# Patient Record
Sex: FEMALE | Race: BLACK OR AFRICAN AMERICAN | NOT HISPANIC OR LATINO
[De-identification: names, ages, dates, MRNs, and addresses within clinical notes are randomized per-mention and may not be internally consistent; named-entity substitution may affect disease eponyms.]

---

## 2018-11-05 PROBLEM — Z00.00 ENCOUNTER FOR PREVENTIVE HEALTH EXAMINATION: Status: ACTIVE | Noted: 2018-11-05

## 2018-11-09 ENCOUNTER — APPOINTMENT (OUTPATIENT)
Dept: BREAST CENTER | Facility: CLINIC | Age: 48
End: 2018-11-09

## 2018-12-06 ENCOUNTER — APPOINTMENT (OUTPATIENT)
Dept: BREAST CENTER | Facility: CLINIC | Age: 48
End: 2018-12-06
Payer: COMMERCIAL

## 2018-12-06 ENCOUNTER — CHART COPY (OUTPATIENT)
Age: 48
End: 2018-12-06

## 2018-12-06 VITALS
BODY MASS INDEX: 33.29 KG/M2 | DIASTOLIC BLOOD PRESSURE: 94 MMHG | SYSTOLIC BLOOD PRESSURE: 146 MMHG | WEIGHT: 195 LBS | HEIGHT: 64 IN | HEART RATE: 116 BPM | OXYGEN SATURATION: 97 % | TEMPERATURE: 98.6 F

## 2018-12-06 DIAGNOSIS — Z78.9 OTHER SPECIFIED HEALTH STATUS: ICD-10-CM

## 2018-12-06 DIAGNOSIS — Z87.09 PERSONAL HISTORY OF OTHER DISEASES OF THE RESPIRATORY SYSTEM: ICD-10-CM

## 2018-12-06 DIAGNOSIS — F17.200 NICOTINE DEPENDENCE, UNSPECIFIED, UNCOMPLICATED: ICD-10-CM

## 2018-12-06 DIAGNOSIS — Z83.3 FAMILY HISTORY OF DIABETES MELLITUS: ICD-10-CM

## 2018-12-06 PROCEDURE — 99203 OFFICE O/P NEW LOW 30 MIN: CPT

## 2020-02-18 ENCOUNTER — APPOINTMENT (OUTPATIENT)
Dept: CARDIOLOGY | Facility: CLINIC | Age: 50
End: 2020-02-18
Payer: COMMERCIAL

## 2020-02-18 ENCOUNTER — NON-APPOINTMENT (OUTPATIENT)
Age: 50
End: 2020-02-18

## 2020-02-18 VITALS
WEIGHT: 238 LBS | HEART RATE: 97 BPM | HEIGHT: 64 IN | OXYGEN SATURATION: 97 % | DIASTOLIC BLOOD PRESSURE: 86 MMHG | BODY MASS INDEX: 40.63 KG/M2 | SYSTOLIC BLOOD PRESSURE: 128 MMHG

## 2020-02-18 DIAGNOSIS — I51.7 CARDIOMEGALY: ICD-10-CM

## 2020-02-18 DIAGNOSIS — R73.03 PREDIABETES.: ICD-10-CM

## 2020-02-18 DIAGNOSIS — Z82.49 FAMILY HISTORY OF ISCHEMIC HEART DISEASE AND OTHER DISEASES OF THE CIRCULATORY SYSTEM: ICD-10-CM

## 2020-02-18 DIAGNOSIS — Z86.39 PERSONAL HISTORY OF OTHER ENDOCRINE, NUTRITIONAL AND METABOLIC DISEASE: ICD-10-CM

## 2020-02-18 DIAGNOSIS — Z87.898 PERSONAL HISTORY OF OTHER SPECIFIED CONDITIONS: ICD-10-CM

## 2020-02-18 PROCEDURE — 99203 OFFICE O/P NEW LOW 30 MIN: CPT

## 2020-02-18 PROCEDURE — 93306 TTE W/DOPPLER COMPLETE: CPT

## 2020-02-18 PROCEDURE — 93000 ELECTROCARDIOGRAM COMPLETE: CPT

## 2020-02-18 NOTE — PHYSICAL EXAM
[General Appearance - Well Developed] : well developed [Normal Appearance] : normal appearance [Well Groomed] : well groomed [General Appearance - Well Nourished] : well nourished [General Appearance - In No Acute Distress] : no acute distress [No Deformities] : no deformities [Normal Conjunctiva] : the conjunctiva exhibited no abnormalities [Eyelids - No Xanthelasma] : the eyelids demonstrated no xanthelasmas [Normal Oral Mucosa] : normal oral mucosa [No Oral Pallor] : no oral pallor [No Oral Cyanosis] : no oral cyanosis [Heart Rate And Rhythm] : heart rate and rhythm were normal [Heart Sounds] : normal S1 and S2 [Murmurs] : no murmurs present [Edema] : no peripheral edema present [Arterial Pulses Normal] : the arterial pulses were normal [Respiration, Rhythm And Depth] : normal respiratory rhythm and effort [Auscultation Breath Sounds / Voice Sounds] : lungs were clear to auscultation bilaterally [Bowel Sounds] : normal bowel sounds [Abdomen Soft] : soft [Abdomen Tenderness] : non-tender [Abnormal Walk] : normal gait [Nail Clubbing] : no clubbing of the fingernails [Cyanosis, Localized] : no localized cyanosis [Skin Color & Pigmentation] : normal skin color and pigmentation [Skin Turgor] : normal skin turgor [] : no rash [Oriented To Time, Place, And Person] : oriented to person, place, and time [Impaired Insight] : insight and judgment were intact [No Anxiety] : not feeling anxious [FreeTextEntry1] : No JVD

## 2020-02-18 NOTE — HISTORY OF PRESENT ILLNESS
[FreeTextEntry1] : Margaret Hess is a 49 year old female with history of Prediabetes is referred for cardiac evaluation for cardiomegaly. Denies any chest pain or shortness of breath on exertion. No palpitations. Physically active. Active cigarette smoker.

## 2020-02-18 NOTE — DISCUSSION/SUMMARY
[FreeTextEntry1] : In a summary Margaret Hess is a middle aged female with Cardiomegaly, echo done showed normal LV systolic function. Asymptomatic cardiac wise. Counseled to quit smoking. Lose weight. Healthy lifestyle. Follow up in 6 months.

## 2020-02-18 NOTE — REVIEW OF SYSTEMS
[Joint Pain] : joint pain [Negative] : Psychiatric [Easy Bleeding] : no tendency for easy bleeding [Easy Bruising] : no tendency for easy bruising

## 2020-08-18 ENCOUNTER — APPOINTMENT (OUTPATIENT)
Dept: CARDIOLOGY | Facility: CLINIC | Age: 50
End: 2020-08-18

## 2020-10-29 ENCOUNTER — APPOINTMENT (OUTPATIENT)
Dept: CARDIOLOGY | Facility: CLINIC | Age: 50
End: 2020-10-29

## 2020-12-09 ENCOUNTER — APPOINTMENT (OUTPATIENT)
Dept: CARDIOLOGY | Facility: CLINIC | Age: 50
End: 2020-12-09
Payer: COMMERCIAL

## 2020-12-09 ENCOUNTER — NON-APPOINTMENT (OUTPATIENT)
Age: 50
End: 2020-12-09

## 2020-12-09 VITALS
HEART RATE: 110 BPM | HEIGHT: 64 IN | TEMPERATURE: 98.1 F | OXYGEN SATURATION: 97 % | DIASTOLIC BLOOD PRESSURE: 84 MMHG | SYSTOLIC BLOOD PRESSURE: 122 MMHG

## 2020-12-09 PROCEDURE — 99214 OFFICE O/P EST MOD 30 MIN: CPT

## 2020-12-09 PROCEDURE — 93000 ELECTROCARDIOGRAM COMPLETE: CPT

## 2020-12-09 PROCEDURE — 99072 ADDL SUPL MATRL&STAF TM PHE: CPT

## 2020-12-09 NOTE — REVIEW OF SYSTEMS
[Joint Pain] : joint pain [Negative] : Endocrine [Easy Bleeding] : no tendency for easy bleeding [Easy Bruising] : no tendency for easy bruising

## 2020-12-09 NOTE — DISCUSSION/SUMMARY
[FreeTextEntry1] : In a summary Margaret Hess is a middle aged female with prediabetes, low Crab diet. Lose weight. Cardiomegaly by chest x ray but Echo is normal. Sinus tachycardia, denies any thyroid problems. Stop coffee and Sodas. Drinks plenty of water. Stop smoking cigarettes. Lose weight. Follow up in 3 months.

## 2020-12-09 NOTE — PHYSICAL EXAM
[General Appearance - Well Developed] : well developed [Normal Appearance] : normal appearance [Well Groomed] : well groomed [General Appearance - Well Nourished] : well nourished [No Deformities] : no deformities [General Appearance - In No Acute Distress] : no acute distress [Normal Conjunctiva] : the conjunctiva exhibited no abnormalities [Eyelids - No Xanthelasma] : the eyelids demonstrated no xanthelasmas [Normal Oral Mucosa] : normal oral mucosa [No Oral Pallor] : no oral pallor [No Oral Cyanosis] : no oral cyanosis [Respiration, Rhythm And Depth] : normal respiratory rhythm and effort [Auscultation Breath Sounds / Voice Sounds] : lungs were clear to auscultation bilaterally [Heart Sounds] : normal S1 and S2 [Murmurs] : no murmurs present [Arterial Pulses Normal] : the arterial pulses were normal [Edema] : no peripheral edema present [Bowel Sounds] : normal bowel sounds [Abdomen Soft] : soft [Abdomen Tenderness] : non-tender [Abnormal Walk] : normal gait [Nail Clubbing] : no clubbing of the fingernails [Cyanosis, Localized] : no localized cyanosis [Skin Color & Pigmentation] : normal skin color and pigmentation [Skin Turgor] : normal skin turgor [] : no rash [Oriented To Time, Place, And Person] : oriented to person, place, and time [Impaired Insight] : insight and judgment were intact [No Anxiety] : not feeling anxious [FreeTextEntry1] : tachycardic.

## 2020-12-09 NOTE — HISTORY OF PRESENT ILLNESS
[FreeTextEntry1] : Margaret Hess is a 50 year old female with prediabetes and Cardiomegaly comes for follow up visit. Denies any chest pain or shortness of breath. No palpitations. Still smoking cigarettes. Physically active. EKG showing sinus tachycardia at 109 bpm. Drinks coffee and small cup of soda.

## 2021-03-24 ENCOUNTER — APPOINTMENT (OUTPATIENT)
Dept: CARDIOLOGY | Facility: CLINIC | Age: 51
End: 2021-03-24

## 2021-04-21 ENCOUNTER — APPOINTMENT (OUTPATIENT)
Dept: CARDIOLOGY | Facility: CLINIC | Age: 51
End: 2021-04-21

## 2021-05-03 ENCOUNTER — APPOINTMENT (OUTPATIENT)
Dept: CARDIOLOGY | Facility: CLINIC | Age: 51
End: 2021-05-03
Payer: COMMERCIAL

## 2021-05-05 ENCOUNTER — NON-APPOINTMENT (OUTPATIENT)
Age: 51
End: 2021-05-05

## 2021-05-05 ENCOUNTER — APPOINTMENT (OUTPATIENT)
Dept: CARDIOLOGY | Facility: CLINIC | Age: 51
End: 2021-05-05
Payer: COMMERCIAL

## 2021-05-05 VITALS
DIASTOLIC BLOOD PRESSURE: 74 MMHG | HEIGHT: 64 IN | SYSTOLIC BLOOD PRESSURE: 110 MMHG | TEMPERATURE: 98.9 F | OXYGEN SATURATION: 96 % | HEART RATE: 101 BPM

## 2021-05-05 DIAGNOSIS — R00.0 TACHYCARDIA, UNSPECIFIED: ICD-10-CM

## 2021-05-05 PROCEDURE — 93306 TTE W/DOPPLER COMPLETE: CPT

## 2021-05-05 PROCEDURE — 93000 ELECTROCARDIOGRAM COMPLETE: CPT

## 2021-05-05 PROCEDURE — 99072 ADDL SUPL MATRL&STAF TM PHE: CPT

## 2021-05-05 PROCEDURE — 99213 OFFICE O/P EST LOW 20 MIN: CPT

## 2021-05-05 NOTE — DISCUSSION/SUMMARY
[FreeTextEntry1] : In a summary Margaret Hess is a middle aged female with on and off sinus tachycardia, EKG showed sinus rhythm at 99 bpm. Echo done showed normal LV systolic function. Lose weight. Quit smoking cigarettes. Drink plenty of water. denies any thyroid disorders or anemia. Follow up in 6 months.

## 2021-05-05 NOTE — HISTORY OF PRESENT ILLNESS
[FreeTextEntry1] : Margaret Hess is a 50 year old female with prediabetes and on and off sinus tachycardia comes for follow up visit. Denies any chest pain or palpitations. No shortness of breath on exertion. Smoking cigarettes. Says physically active. Denies drinking  any coffee, Tea etc. Follows up with PCP.

## 2021-05-05 NOTE — PHYSICAL EXAM
[Normal S1, S2] : normal S1, S2 [No Carotid Bruit] : no carotid bruit [Soft] : abdomen soft [Non Tender] : non-tender [Normal Bowel Sounds] : normal bowel sounds [No Edema] : no edema [No Cyanosis] : no cyanosis [Normal] : alert and oriented, normal memory

## 2021-11-03 ENCOUNTER — APPOINTMENT (OUTPATIENT)
Dept: CARDIOLOGY | Facility: CLINIC | Age: 51
End: 2021-11-03

## 2024-12-27 ENCOUNTER — INPATIENT (INPATIENT)
Facility: HOSPITAL | Age: 54
LOS: 1 days | Discharge: HOME | End: 2024-12-29
Attending: INTERNAL MEDICINE | Admitting: INTERNAL MEDICINE
Payer: COMMERCIAL

## 2024-12-27 VITALS
RESPIRATION RATE: 18 BRPM | HEART RATE: 110 BPM | DIASTOLIC BLOOD PRESSURE: 82 MMHG | OXYGEN SATURATION: 99 % | TEMPERATURE: 101 F | HEIGHT: 65 IN | WEIGHT: 169.98 LBS | SYSTOLIC BLOOD PRESSURE: 118 MMHG

## 2024-12-27 DIAGNOSIS — N61.1 ABSCESS OF THE BREAST AND NIPPLE: ICD-10-CM

## 2024-12-27 DIAGNOSIS — A41.9 SEPSIS, UNSPECIFIED ORGANISM: ICD-10-CM

## 2024-12-27 LAB
ALBUMIN SERPL ELPH-MCNC: 3.3 G/DL — SIGNIFICANT CHANGE UP (ref 3.3–5)
ALP SERPL-CCNC: 87 U/L — SIGNIFICANT CHANGE UP (ref 40–120)
ALT FLD-CCNC: 21 U/L — SIGNIFICANT CHANGE UP (ref 12–78)
ANION GAP SERPL CALC-SCNC: 5 MMOL/L — SIGNIFICANT CHANGE UP (ref 5–17)
APTT BLD: 32.9 SEC — SIGNIFICANT CHANGE UP (ref 24.5–35.6)
AST SERPL-CCNC: 12 U/L — LOW (ref 15–37)
B-OH-BUTYR SERPL-SCNC: 0.2 MMOL/L — SIGNIFICANT CHANGE UP
BASOPHILS # BLD AUTO: 0.03 K/UL — SIGNIFICANT CHANGE UP (ref 0–0.2)
BASOPHILS NFR BLD AUTO: 0.2 % — SIGNIFICANT CHANGE UP (ref 0–2)
BILIRUB SERPL-MCNC: 0.3 MG/DL — SIGNIFICANT CHANGE UP (ref 0.2–1.2)
BUN SERPL-MCNC: 8 MG/DL — SIGNIFICANT CHANGE UP (ref 7–23)
CALCIUM SERPL-MCNC: 9.4 MG/DL — SIGNIFICANT CHANGE UP (ref 8.5–10.1)
CHLORIDE SERPL-SCNC: 98 MMOL/L — SIGNIFICANT CHANGE UP (ref 96–108)
CO2 SERPL-SCNC: 29 MMOL/L — SIGNIFICANT CHANGE UP (ref 22–31)
CREAT SERPL-MCNC: 0.81 MG/DL — SIGNIFICANT CHANGE UP (ref 0.5–1.3)
EGFR: 85 ML/MIN/1.73M2 — SIGNIFICANT CHANGE UP
EOSINOPHIL # BLD AUTO: 0.13 K/UL — SIGNIFICANT CHANGE UP (ref 0–0.5)
EOSINOPHIL NFR BLD AUTO: 1 % — SIGNIFICANT CHANGE UP (ref 0–6)
FLUAV AG NPH QL: SIGNIFICANT CHANGE UP
FLUBV AG NPH QL: SIGNIFICANT CHANGE UP
GLUCOSE BLDC GLUCOMTR-MCNC: 258 MG/DL — HIGH (ref 70–99)
GLUCOSE BLDC GLUCOMTR-MCNC: 306 MG/DL — HIGH (ref 70–99)
GLUCOSE SERPL-MCNC: 273 MG/DL — HIGH (ref 70–99)
GRAM STN FLD: ABNORMAL
HCT VFR BLD CALC: 39.4 % — SIGNIFICANT CHANGE UP (ref 34.5–45)
HGB BLD-MCNC: 13.5 G/DL — SIGNIFICANT CHANGE UP (ref 11.5–15.5)
IMM GRANULOCYTES NFR BLD AUTO: 0.5 % — SIGNIFICANT CHANGE UP (ref 0–0.9)
INR BLD: 1.04 RATIO — SIGNIFICANT CHANGE UP (ref 0.85–1.16)
LACTATE SERPL-SCNC: 1.3 MMOL/L — SIGNIFICANT CHANGE UP (ref 0.7–2)
LYMPHOCYTES # BLD AUTO: 15.6 % — SIGNIFICANT CHANGE UP (ref 13–44)
LYMPHOCYTES # BLD AUTO: 2.1 K/UL — SIGNIFICANT CHANGE UP (ref 1–3.3)
MCHC RBC-ENTMCNC: 30.7 PG — SIGNIFICANT CHANGE UP (ref 27–34)
MCHC RBC-ENTMCNC: 34.3 G/DL — SIGNIFICANT CHANGE UP (ref 32–36)
MCV RBC AUTO: 89.5 FL — SIGNIFICANT CHANGE UP (ref 80–100)
MONOCYTES # BLD AUTO: 0.88 K/UL — SIGNIFICANT CHANGE UP (ref 0–0.9)
MONOCYTES NFR BLD AUTO: 6.5 % — SIGNIFICANT CHANGE UP (ref 2–14)
NEUTROPHILS # BLD AUTO: 10.24 K/UL — HIGH (ref 1.8–7.4)
NEUTROPHILS NFR BLD AUTO: 76.2 % — SIGNIFICANT CHANGE UP (ref 43–77)
NRBC # BLD: 0 /100 WBCS — SIGNIFICANT CHANGE UP (ref 0–0)
PLATELET # BLD AUTO: 283 K/UL — SIGNIFICANT CHANGE UP (ref 150–400)
POTASSIUM SERPL-MCNC: 3.7 MMOL/L — SIGNIFICANT CHANGE UP (ref 3.5–5.3)
POTASSIUM SERPL-SCNC: 3.7 MMOL/L — SIGNIFICANT CHANGE UP (ref 3.5–5.3)
PROT SERPL-MCNC: 8.1 GM/DL — SIGNIFICANT CHANGE UP (ref 6–8.3)
PROTHROM AB SERPL-ACNC: 12.1 SEC — SIGNIFICANT CHANGE UP (ref 9.9–13.4)
RBC # BLD: 4.4 M/UL — SIGNIFICANT CHANGE UP (ref 3.8–5.2)
RBC # FLD: 12.5 % — SIGNIFICANT CHANGE UP (ref 10.3–14.5)
RSV RNA NPH QL NAA+NON-PROBE: SIGNIFICANT CHANGE UP
SARS-COV-2 RNA SPEC QL NAA+PROBE: SIGNIFICANT CHANGE UP
SODIUM SERPL-SCNC: 132 MMOL/L — LOW (ref 135–145)
SPECIMEN SOURCE: SIGNIFICANT CHANGE UP
WBC # BLD: 13.45 K/UL — HIGH (ref 3.8–10.5)
WBC # FLD AUTO: 13.45 K/UL — HIGH (ref 3.8–10.5)

## 2024-12-27 PROCEDURE — 10061 I&D ABSCESS COMP/MULTIPLE: CPT

## 2024-12-27 PROCEDURE — 99223 1ST HOSP IP/OBS HIGH 75: CPT

## 2024-12-27 PROCEDURE — 99285 EMERGENCY DEPT VISIT HI MDM: CPT | Mod: 25

## 2024-12-27 PROCEDURE — 71046 X-RAY EXAM CHEST 2 VIEWS: CPT | Mod: 26

## 2024-12-27 PROCEDURE — 99222 1ST HOSP IP/OBS MODERATE 55: CPT

## 2024-12-27 RX ORDER — SODIUM CHLORIDE 9 MG/ML
1000 INJECTION, SOLUTION INTRAVENOUS
Refills: 0 | Status: DISCONTINUED | OUTPATIENT
Start: 2024-12-27 | End: 2024-12-29

## 2024-12-27 RX ORDER — VANCOMYCIN HYDROCHLORIDE 5 G/100ML
750 INJECTION, POWDER, LYOPHILIZED, FOR SOLUTION INTRAVENOUS EVERY 12 HOURS
Refills: 0 | Status: DISCONTINUED | OUTPATIENT
Start: 2024-12-28 | End: 2024-12-29

## 2024-12-27 RX ORDER — METFORMIN 850 MG/1
1 TABLET ORAL
Refills: 0 | DISCHARGE

## 2024-12-27 RX ORDER — DEXTROSE MONOHYDRATE 25 G/50ML
25 INJECTION, SOLUTION INTRAVENOUS ONCE
Refills: 0 | Status: DISCONTINUED | OUTPATIENT
Start: 2024-12-27 | End: 2024-12-29

## 2024-12-27 RX ORDER — GLUCAGON INJECTION, SOLUTION 0.5 MG/.1ML
1 INJECTION, SOLUTION SUBCUTANEOUS ONCE
Refills: 0 | Status: DISCONTINUED | OUTPATIENT
Start: 2024-12-27 | End: 2024-12-29

## 2024-12-27 RX ORDER — PIPERACILLIN AND TAZOBACTAM 3; .375 G/15ML; G/15ML
3.38 INJECTION, POWDER, LYOPHILIZED, FOR SOLUTION INTRAVENOUS ONCE
Refills: 0 | Status: COMPLETED | OUTPATIENT
Start: 2024-12-27 | End: 2024-12-27

## 2024-12-27 RX ORDER — PIPERACILLIN AND TAZOBACTAM 3; .375 G/15ML; G/15ML
3.38 INJECTION, POWDER, LYOPHILIZED, FOR SOLUTION INTRAVENOUS ONCE
Refills: 0 | Status: COMPLETED | OUTPATIENT
Start: 2024-12-28 | End: 2024-12-28

## 2024-12-27 RX ORDER — PIPERACILLIN AND TAZOBACTAM 3; .375 G/15ML; G/15ML
3.38 INJECTION, POWDER, LYOPHILIZED, FOR SOLUTION INTRAVENOUS EVERY 8 HOURS
Refills: 0 | Status: DISCONTINUED | OUTPATIENT
Start: 2024-12-28 | End: 2024-12-29

## 2024-12-27 RX ORDER — DEXTROSE MONOHYDRATE 25 G/50ML
12.5 INJECTION, SOLUTION INTRAVENOUS ONCE
Refills: 0 | Status: DISCONTINUED | OUTPATIENT
Start: 2024-12-27 | End: 2024-12-29

## 2024-12-27 RX ORDER — INSULIN LISPRO 100/ML
VIAL (ML) SUBCUTANEOUS
Refills: 0 | Status: DISCONTINUED | OUTPATIENT
Start: 2024-12-27 | End: 2024-12-29

## 2024-12-27 RX ORDER — OXYCODONE HCL 15 MG
5 TABLET ORAL EVERY 6 HOURS
Refills: 0 | Status: DISCONTINUED | OUTPATIENT
Start: 2024-12-27 | End: 2024-12-29

## 2024-12-27 RX ORDER — CEFAZOLIN SODIUM 1 G
2000 VIAL (EA) INJECTION ONCE
Refills: 0 | Status: COMPLETED | OUTPATIENT
Start: 2024-12-27 | End: 2024-12-27

## 2024-12-27 RX ORDER — VANCOMYCIN HYDROCHLORIDE 5 G/100ML
1250 INJECTION, POWDER, LYOPHILIZED, FOR SOLUTION INTRAVENOUS EVERY 12 HOURS
Refills: 0 | Status: DISCONTINUED | OUTPATIENT
Start: 2024-12-27 | End: 2024-12-27

## 2024-12-27 RX ORDER — INSULIN LISPRO 100/ML
VIAL (ML) SUBCUTANEOUS AT BEDTIME
Refills: 0 | Status: DISCONTINUED | OUTPATIENT
Start: 2024-12-27 | End: 2024-12-29

## 2024-12-27 RX ORDER — ACETAMINOPHEN 80 MG/.8ML
1000 SOLUTION/ DROPS ORAL ONCE
Refills: 0 | Status: COMPLETED | OUTPATIENT
Start: 2024-12-27 | End: 2024-12-27

## 2024-12-27 RX ORDER — KETOROLAC TROMETHAMINE 30 MG/ML
15 INJECTION INTRAMUSCULAR; INTRAVENOUS ONCE
Refills: 0 | Status: DISCONTINUED | OUTPATIENT
Start: 2024-12-27 | End: 2024-12-27

## 2024-12-27 RX ORDER — GINKGO BILOBA 40 MG
3 CAPSULE ORAL AT BEDTIME
Refills: 0 | Status: DISCONTINUED | OUTPATIENT
Start: 2024-12-27 | End: 2024-12-29

## 2024-12-27 RX ORDER — DEXTROSE MONOHYDRATE 25 G/50ML
15 INJECTION, SOLUTION INTRAVENOUS ONCE
Refills: 0 | Status: DISCONTINUED | OUTPATIENT
Start: 2024-12-27 | End: 2024-12-29

## 2024-12-27 RX ORDER — ONDANSETRON 4 MG/1
4 TABLET ORAL EVERY 8 HOURS
Refills: 0 | Status: DISCONTINUED | OUTPATIENT
Start: 2024-12-27 | End: 2024-12-29

## 2024-12-27 RX ORDER — ACETAMINOPHEN 80 MG/.8ML
650 SOLUTION/ DROPS ORAL EVERY 6 HOURS
Refills: 0 | Status: DISCONTINUED | OUTPATIENT
Start: 2024-12-27 | End: 2024-12-29

## 2024-12-27 RX ORDER — VANCOMYCIN HYDROCHLORIDE 5 G/100ML
1000 INJECTION, POWDER, LYOPHILIZED, FOR SOLUTION INTRAVENOUS ONCE
Refills: 0 | Status: COMPLETED | OUTPATIENT
Start: 2024-12-27 | End: 2024-12-27

## 2024-12-27 RX ADMIN — Medication 2000 MILLIGRAM(S): at 15:20

## 2024-12-27 RX ADMIN — Medication 1: at 21:51

## 2024-12-27 RX ADMIN — Medication 4: at 17:20

## 2024-12-27 RX ADMIN — Medication 5 MILLIGRAM(S): at 21:53

## 2024-12-27 RX ADMIN — VANCOMYCIN HYDROCHLORIDE 1000 MILLIGRAM(S): 5 INJECTION, POWDER, LYOPHILIZED, FOR SOLUTION INTRAVENOUS at 14:46

## 2024-12-27 RX ADMIN — ACETAMINOPHEN 1000 MILLIGRAM(S): 80 SOLUTION/ DROPS ORAL at 14:47

## 2024-12-27 RX ADMIN — Medication 100 MILLIGRAM(S): at 14:46

## 2024-12-27 RX ADMIN — ACETAMINOPHEN 1000 MILLIGRAM(S): 80 SOLUTION/ DROPS ORAL at 14:46

## 2024-12-27 RX ADMIN — Medication 5 MILLIGRAM(S): at 23:04

## 2024-12-27 RX ADMIN — Medication 3 MILLIGRAM(S): at 21:53

## 2024-12-27 RX ADMIN — SODIUM CHLORIDE 125 MILLILITER(S): 9 INJECTION, SOLUTION INTRAVENOUS at 17:07

## 2024-12-27 RX ADMIN — ACETAMINOPHEN 400 MILLIGRAM(S): 80 SOLUTION/ DROPS ORAL at 13:42

## 2024-12-27 RX ADMIN — KETOROLAC TROMETHAMINE 15 MILLIGRAM(S): 30 INJECTION INTRAMUSCULAR; INTRAVENOUS at 14:46

## 2024-12-27 RX ADMIN — VANCOMYCIN HYDROCHLORIDE 250 MILLIGRAM(S): 5 INJECTION, POWDER, LYOPHILIZED, FOR SOLUTION INTRAVENOUS at 12:11

## 2024-12-27 RX ADMIN — ACETAMINOPHEN 650 MILLIGRAM(S): 80 SOLUTION/ DROPS ORAL at 17:06

## 2024-12-27 RX ADMIN — KETOROLAC TROMETHAMINE 15 MILLIGRAM(S): 30 INJECTION INTRAMUSCULAR; INTRAVENOUS at 12:11

## 2024-12-27 RX ADMIN — PIPERACILLIN AND TAZOBACTAM 200 GRAM(S): 3; .375 INJECTION, POWDER, LYOPHILIZED, FOR SOLUTION INTRAVENOUS at 16:16

## 2024-12-27 NOTE — ED ADULT NURSE REASSESSMENT NOTE - NS ED NURSE REASSESS COMMENT FT1
Patient noted taking self off monitor, needing to go to the bathroom often. Pt also expressing not wanting to be on the monitor and hooked up to all wires all the time. Pt in stable condition. MD aware.

## 2024-12-27 NOTE — ED PROVIDER NOTE - PHYSICAL EXAMINATION
GENERAL: Awake, alert, NAD  HEENT: NC/AT, moist mucous membranes, PERRL, EOMI  LUNGS: CTAB, no wheezes or crackles   CARDIAC: RRR, no m/r/g  ABDOMEN: Soft, non tender, non distended, no rebound, no guarding  Breast:: 3cm area of fluctuance on inferior aspect of left breast medially, TTP  with surrounding erythema   BACK: No midline spinal tenderness, no CVA tenderness  EXT: No edema, no calf tenderness, 2+ DP pulses bilaterally, no deformities.  NEURO: A&Ox3. Moving all extremities.  SKIN: Warm and dry. No rash.  PSYCH: Normal affect.

## 2024-12-27 NOTE — ED ADULT NURSE NOTE - NSFALLUNIVINTERV_ED_ALL_ED
Bed/Stretcher in lowest position, wheels locked, appropriate side rails in place/Call bell, personal items and telephone in reach/Instruct patient to call for assistance before getting out of bed/chair/stretcher/Non-slip footwear applied when patient is off stretcher/Burns to call system/Physically safe environment - no spills, clutter or unnecessary equipment/Purposeful proactive rounding/Room/bathroom lighting operational, light cord in reach

## 2024-12-27 NOTE — ED PROVIDER NOTE - CLINICAL SUMMARY MEDICAL DECISION MAKING FREE TEXT BOX
This is a 54-year-old female, medical history significant for DM, presenting for abscess of her left breast.  The patient states that her symptoms began about 3 days ago.  The patient states that she has had abscesses under her left breast previously and has had to have them drained.  The patient states that at home she has been taking Tylenol for symptom relief.  The pain is localized to the patient's left breast.  The patient denies any fevers or chills but states when she was at urgent care today before presenting to the hospital and she was told she had a fever.  Besides the pain under the left breast patient denies any other symptoms. ROS is otherwise negative.  VS.  Patient tachycardic, febrile, concern for sepsis with source being abscess.  PE.  3cm area of fluctuance on inferior aspect of left breast medially, TTP  with surrounding erythema. Will obtain basic labs, blood cultures, viral swab, will give empiric antibiotics.  The differential is not limited to sepsis secondary to skin abscess, MRSA, cellulitis , will also evaluate for electrolyte/hematological derangements, viral syndrome. patient with no abdominal pain, or urinary symptoms, low suspicion for intra-abdominal infection/UTI,  will give pain control.  Final disposition pending abscess drainage, labs and reassessment.

## 2024-12-27 NOTE — H&P ADULT - PROBLEM SELECTOR PLAN 1
febrile, tachycardic, leukocytosis, left breast abscess---> meet sepsis criteria  CXR  ( I personally review) with no focal consolidation  S/P left breast abscess drainage in ED  Continue with vancomycin, Zosyn  Monitor vanco trough, therapeutic monitoring is necessary with IV vancomycin  Follow up blood and abscess culture  Check HIV test with AM lab  surgery consulted  Closely monitor hemodynamic status. Still tachycardic, Gentle IV hydration. Monitor HR  ID consulted

## 2024-12-27 NOTE — ED PROCEDURE NOTE - NS ED ATTENDING STATEMENT MOD
I have seen and examined this patient and fully participated in the care of this patient as the teaching attending.  The service was shared with the NADEGE.  I reviewed and verified the documentation.

## 2024-12-27 NOTE — ED ADULT NURSE NOTE - NS TRANSFER PATIENT BELONGINGS
Returned call to pt's mother regarding impetigo a few weeks ago. She states that pt has a small sore on her knee and it is oozing clear liquid and she does not know if it is the impetigo returning, due to her being diagnosed with it a couple weeks ago. She wants to know if you can call in an antibiotic for her or does she need to be seen. I advised her that I will give the message to Dr. Garcia and once he responds I will return call. She verbalized understanding.    Electronic Device (specify)/Clothing

## 2024-12-27 NOTE — ED PROVIDER NOTE - ATTENDING CONTRIBUTION TO CARE
I evaluated the patient simultaneously with Dr. Pal and the above documentation accurately reflects our exam. I was present when Dr. Pal performed the I&D and I approved her plan to order sepsis workup and admit for sepsis 2/2 breast abscess complicated by dm with elevated glu.

## 2024-12-27 NOTE — H&P ADULT - NSHPPHYSICALEXAM_GEN_ALL_CORE
CONSTITUTIONAL: alert and cooperative, no acute distress.   EYES: PERRL, no scleral icterus  ENT: Mucosa moist, tongue normal  NECK: Neck supple, trachea midline, non-tender  CARDIAC: Normal S1 and S2. Regular rate and rhythms. No Pedal edema. Peripheral pulses intact  LUNGS: Equal air entry both lungs. No rales, rhonchi, wheezing. Normal respiratory effort.   ABDOMEN: Soft, nondistended, nontender. No guarding or rebound tenderness. No hepatomegaly or splenomegaly. Bowel sound normal.   MUSCULOSKELETAL: Normocephalic, atraumatic. No significant deformity or joint abnormality  NEUROLOGICAL: No gross motor or sensory deficits.  SKIN: S/P drainage of left breast abscess, dressing in place  PSYCHIATRIC: A&O x 3, appropriate mood and affect.

## 2024-12-27 NOTE — ED ADULT TRIAGE NOTE - CHIEF COMPLAINT QUOTE
pt c/o abscess underneath left breast   for 4 day. c/o pain and swelling at the site.   history of similar abscess in the past. history of diabetes. fs 280

## 2024-12-27 NOTE — H&P ADULT - PROBLEM SELECTOR PLAN 2
s/p drainage by ED team  follow up blood and abscess culture  pain control  Continue with vancomycin and zosyn  Surgery consulted

## 2024-12-27 NOTE — H&P ADULT - ASSESSMENT
54 years old female with h/o DM and breast abscess present to ED with complain of left breast pain/abscess. Patient reported pain/abscess underneath left breast for last few days. Denied any trauma or scratching the area. Patient reported 2 episode of breast abscess before. First episode had sponatenous drainage. Second episode was around 7 years ago and was drained in urgent care.   Febrile to 100.8, tachycardic. WBC 13.45, plt 283, Cr 0.81, lactate 1.3. Flu/COVID negative. CXR with no focal consolidation

## 2024-12-27 NOTE — CONSULT NOTE ADULT - SUBJECTIVE AND OBJECTIVE BOX
HPI:  55yo F with PMH of DM, breast abscess x2, presents to ED c/o left breast abscess. Per patient, she states she felt an abscess forming under her left breast about 5 days ago with associated discomfort at site. She states she has had two previous left breast abscesses; the first one was "years ago", which drained on its own, and the second a few years after, which was drained at an Urgent Care. She admits to taking oral antibiotics for the second left breast abscess but does not remember which antibiotic. She admits to fevers while in ED, but denies any fevers at home, chills, N/V/D, CP, SOB.     PAST MEDICAL & SURGICAL HISTORY:  Diabetes    Review of Systems:  contained within HPI    MEDICATIONS  (STANDING):  dextrose 5%. 1000 milliLiter(s) (50 mL/Hr) IV Continuous <Continuous>  dextrose 5%. 1000 milliLiter(s) (100 mL/Hr) IV Continuous <Continuous>  dextrose 50% Injectable 25 Gram(s) IV Push once  dextrose 50% Injectable 12.5 Gram(s) IV Push once  dextrose 50% Injectable 25 Gram(s) IV Push once  glucagon  Injectable 1 milliGRAM(s) IntraMuscular once  insulin lispro (ADMELOG) corrective regimen sliding scale   SubCutaneous three times a day before meals  insulin lispro (ADMELOG) corrective regimen sliding scale   SubCutaneous at bedtime    MEDICATIONS  (PRN):  acetaminophen     Tablet .. 650 milliGRAM(s) Oral every 6 hours PRN Temp greater or equal to 38C (100.4F), Mild Pain (1 - 3), Moderate Pain (4 - 6)  dextrose Oral Gel 15 Gram(s) Oral once PRN Blood Glucose LESS THAN 70 milliGRAM(s)/deciliter  melatonin 3 milliGRAM(s) Oral at bedtime PRN Insomnia  ondansetron Injectable 4 milliGRAM(s) IV Push every 8 hours PRN Nausea and/or Vomiting  oxyCODONE    IR 5 milliGRAM(s) Oral every 6 hours PRN Severe Pain (7 - 10)      Allergies    No Known Allergies    Intolerances        SOCIAL HISTORY          Smoking: Yes [X]  No [ ]   ___20___pk yrs          ETOH  Yes [ ]  No [ ]  Social [X]          DRUGS:  Yes [ ]  No [X]  if so what______________    FAMILY HISTORY:  Unknown     Vital Signs Last 24 Hrs  T(C): 38.2 (27 Dec 2024 09:51), Max: 38.2 (27 Dec 2024 09:51)  T(F): 100.8 (27 Dec 2024 09:51), Max: 100.8 (27 Dec 2024 09:51)  HR: 110 (27 Dec 2024 09:51) (110 - 110)  BP: 118/82 (27 Dec 2024 09:51) (118/82 - 118/82)  RR: 18 (27 Dec 2024 09:51) (18 - 18)  SpO2: 99% (27 Dec 2024 09:51) (99% - 99%)    Physical Exam:  General:  Appears stated age, well-groomed, well-nourished, no distress  Eyes: EOMI  HENT: NCAT. WNL, no JVD  Chest: clear breath sounds. Small 1cm abscess under left breast fold with surrounding erythema. With foul smelling, blood tinged drainage. S/p I&D and packed with 1/4in iodoform   Cardiovascular: Regular rate & rhythm  Abdomen: soft, NT/ND   Extremities: non edematous   Skin: No rash  Musculoskeletal: normal strength  Neuro/Psych: AAO x3      LABS:                        13.5   13.45 )-----------( 283      ( 27 Dec 2024 11:50 )             39.4     12-27    132[L]  |  98  |  8   ----------------------------<  273[H]  3.7   |  29  |  0.81    Ca    9.4      27 Dec 2024 11:50    TPro  8.1  /  Alb  3.3  /  TBili  0.3  /  DBili  x   /  AST  12[L]  /  ALT  21  /  AlkPhos  87  12-27    PT/INR - ( 27 Dec 2024 11:50 )   PT: 12.1 sec;   INR: 1.04 ratio         PTT - ( 27 Dec 2024 11:50 )  PTT:32.9 sec  Urinalysis Basic - ( 27 Dec 2024 11:50 )    Color: x / Appearance: x / SG: x / pH: x  Gluc: 273 mg/dL / Ketone: x  / Bili: x / Urobili: x   Blood: x / Protein: x / Nitrite: x   Leuk Esterase: x / RBC: x / WBC x   Sq Epi: x / Non Sq Epi: x / Bacteria: x        RADIOLOGY & ADDITIONAL STUDIES:    A/P  55yo F with PMH of DM, h/o breast abscess x2, presents to ED c/o left breast abscess now s/p I&D by ER team.   Febrile, with leukocytosis.   Continue abx   f/u Cx  Local wound care per RN   OP f/u with Dr. Antonette Hawkins, breast surgeon   No further surgical intervention, will sign off   d/w Dr. Craig

## 2024-12-27 NOTE — ED ADULT NURSE NOTE - OBJECTIVE STATEMENT
Patient c/o painful, red abscess under L breast since 12/23. Patient denies drainage, fever or chills.

## 2024-12-27 NOTE — PATIENT PROFILE ADULT - IS THERE A SUSPICION OF ABUSE/NEGLIGENCE?
Writer called patient to schedule EGD, from previous COVID recall list.   Patient will call back to schedule once COIVID-19 settles down and she can get a .      no

## 2024-12-27 NOTE — PHARMACOTHERAPY INTERVENTION NOTE - COMMENTS
As per policy for dose optimization on antimicrobials, adjusted vancomycin 1250mg q12H to vancomycin 750mg q12H based on Bayesian modeling for predicted AUC of ~430. Goal -600.

## 2024-12-27 NOTE — H&P ADULT - HISTORY OF PRESENT ILLNESS
54 years old female with h/o DM and breast abscess present to ED with complain of left breast pain/abscess. Patient reported pain/abscess underneath left breast for last few days. Denied any trauma or scratching the area. Patient reported 2 episode of breast abscess before. First episode had sponatenous drainage. Second episode was around 7 years ago and was drained in urgent care.   Febrile to 100.8, tachycardic. WBC 13.45, plt 283, Cr 0.81, lactate 1.3. Flu/COVID negative. CXR with no focal consolidation      SH: no toxic habits

## 2024-12-27 NOTE — ED PROVIDER NOTE - CARE PLAN
Principal Discharge DX:	Left breast abscess   1 Principal Discharge DX:	Sepsis without septic shock  Secondary Diagnosis:	Left breast abscess  Secondary Diagnosis:	Diabetes mellitus with hyperglycemia

## 2024-12-28 LAB
A1C WITH ESTIMATED AVERAGE GLUCOSE RESULT: 11.3 % — HIGH (ref 4–5.6)
ALBUMIN SERPL ELPH-MCNC: 2.8 G/DL — LOW (ref 3.3–5)
ALP SERPL-CCNC: 83 U/L — SIGNIFICANT CHANGE UP (ref 40–120)
ALT FLD-CCNC: 25 U/L — SIGNIFICANT CHANGE UP (ref 12–78)
ANION GAP SERPL CALC-SCNC: 4 MMOL/L — LOW (ref 5–17)
AST SERPL-CCNC: 17 U/L — SIGNIFICANT CHANGE UP (ref 15–37)
BILIRUB SERPL-MCNC: 0.3 MG/DL — SIGNIFICANT CHANGE UP (ref 0.2–1.2)
BUN SERPL-MCNC: 10 MG/DL — SIGNIFICANT CHANGE UP (ref 7–23)
CALCIUM SERPL-MCNC: 9.1 MG/DL — SIGNIFICANT CHANGE UP (ref 8.5–10.1)
CHLORIDE SERPL-SCNC: 98 MMOL/L — SIGNIFICANT CHANGE UP (ref 96–108)
CO2 SERPL-SCNC: 30 MMOL/L — SIGNIFICANT CHANGE UP (ref 22–31)
CREAT SERPL-MCNC: 0.89 MG/DL — SIGNIFICANT CHANGE UP (ref 0.5–1.3)
EGFR: 76 ML/MIN/1.73M2 — SIGNIFICANT CHANGE UP
ESTIMATED AVERAGE GLUCOSE: 278 MG/DL — HIGH (ref 68–114)
GLUCOSE BLDC GLUCOMTR-MCNC: 287 MG/DL — HIGH (ref 70–99)
GLUCOSE BLDC GLUCOMTR-MCNC: 313 MG/DL — HIGH (ref 70–99)
GLUCOSE BLDC GLUCOMTR-MCNC: 332 MG/DL — HIGH (ref 70–99)
GLUCOSE BLDC GLUCOMTR-MCNC: 378 MG/DL — HIGH (ref 70–99)
GLUCOSE BLDC GLUCOMTR-MCNC: 395 MG/DL — HIGH (ref 70–99)
GLUCOSE SERPL-MCNC: 317 MG/DL — HIGH (ref 70–99)
HCT VFR BLD CALC: 37.7 % — SIGNIFICANT CHANGE UP (ref 34.5–45)
HGB BLD-MCNC: 12.6 G/DL — SIGNIFICANT CHANGE UP (ref 11.5–15.5)
HIV 1+2 AB+HIV1 P24 AG SERPL QL IA: SIGNIFICANT CHANGE UP
MAGNESIUM SERPL-MCNC: 1.9 MG/DL — SIGNIFICANT CHANGE UP (ref 1.6–2.6)
MCHC RBC-ENTMCNC: 30.4 PG — SIGNIFICANT CHANGE UP (ref 27–34)
MCHC RBC-ENTMCNC: 33.4 G/DL — SIGNIFICANT CHANGE UP (ref 32–36)
MCV RBC AUTO: 91.1 FL — SIGNIFICANT CHANGE UP (ref 80–100)
NRBC # BLD: 0 /100 WBCS — SIGNIFICANT CHANGE UP (ref 0–0)
PHOSPHATE SERPL-MCNC: 3.5 MG/DL — SIGNIFICANT CHANGE UP (ref 2.5–4.5)
PLATELET # BLD AUTO: 274 K/UL — SIGNIFICANT CHANGE UP (ref 150–400)
POTASSIUM SERPL-MCNC: 3.6 MMOL/L — SIGNIFICANT CHANGE UP (ref 3.5–5.3)
POTASSIUM SERPL-SCNC: 3.6 MMOL/L — SIGNIFICANT CHANGE UP (ref 3.5–5.3)
PROT SERPL-MCNC: 7.5 GM/DL — SIGNIFICANT CHANGE UP (ref 6–8.3)
RBC # BLD: 4.14 M/UL — SIGNIFICANT CHANGE UP (ref 3.8–5.2)
RBC # FLD: 12.4 % — SIGNIFICANT CHANGE UP (ref 10.3–14.5)
SODIUM SERPL-SCNC: 132 MMOL/L — LOW (ref 135–145)
VANCOMYCIN FLD-MCNC: 3.3 UG/ML — SIGNIFICANT CHANGE UP
WBC # BLD: 8.53 K/UL — SIGNIFICANT CHANGE UP (ref 3.8–10.5)
WBC # FLD AUTO: 8.53 K/UL — SIGNIFICANT CHANGE UP (ref 3.8–10.5)

## 2024-12-28 PROCEDURE — 99232 SBSQ HOSP IP/OBS MODERATE 35: CPT

## 2024-12-28 PROCEDURE — 99233 SBSQ HOSP IP/OBS HIGH 50: CPT

## 2024-12-28 RX ORDER — ENOXAPARIN SODIUM 60 MG/.6ML
40 INJECTION INTRAVENOUS; SUBCUTANEOUS EVERY 24 HOURS
Refills: 0 | Status: DISCONTINUED | OUTPATIENT
Start: 2024-12-28 | End: 2024-12-29

## 2024-12-28 RX ADMIN — PIPERACILLIN AND TAZOBACTAM 25 GRAM(S): 3; .375 INJECTION, POWDER, LYOPHILIZED, FOR SOLUTION INTRAVENOUS at 01:15

## 2024-12-28 RX ADMIN — Medication 5: at 16:55

## 2024-12-28 RX ADMIN — Medication 5 MILLIGRAM(S): at 21:45

## 2024-12-28 RX ADMIN — Medication 3: at 07:46

## 2024-12-28 RX ADMIN — Medication 5 MILLIGRAM(S): at 12:15

## 2024-12-28 RX ADMIN — VANCOMYCIN HYDROCHLORIDE 250 MILLIGRAM(S): 5 INJECTION, POWDER, LYOPHILIZED, FOR SOLUTION INTRAVENOUS at 12:15

## 2024-12-28 RX ADMIN — Medication 2: at 21:44

## 2024-12-28 RX ADMIN — PIPERACILLIN AND TAZOBACTAM 25 GRAM(S): 3; .375 INJECTION, POWDER, LYOPHILIZED, FOR SOLUTION INTRAVENOUS at 13:21

## 2024-12-28 RX ADMIN — ENOXAPARIN SODIUM 40 MILLIGRAM(S): 60 INJECTION INTRAVENOUS; SUBCUTANEOUS at 12:14

## 2024-12-28 RX ADMIN — PIPERACILLIN AND TAZOBACTAM 25 GRAM(S): 3; .375 INJECTION, POWDER, LYOPHILIZED, FOR SOLUTION INTRAVENOUS at 21:43

## 2024-12-28 RX ADMIN — Medication 5 MILLIGRAM(S): at 23:12

## 2024-12-28 RX ADMIN — Medication 5: at 12:15

## 2024-12-28 RX ADMIN — VANCOMYCIN HYDROCHLORIDE 250 MILLIGRAM(S): 5 INJECTION, POWDER, LYOPHILIZED, FOR SOLUTION INTRAVENOUS at 00:18

## 2024-12-28 RX ADMIN — PIPERACILLIN AND TAZOBACTAM 25 GRAM(S): 3; .375 INJECTION, POWDER, LYOPHILIZED, FOR SOLUTION INTRAVENOUS at 07:50

## 2024-12-28 NOTE — PROGRESS NOTE ADULT - ASSESSMENT
54 years old female with h/o DM and breast abscess present to ED with complain of left breast pain/abscess. Patient reported pain/abscess underneath left breast for last few days. Denied any trauma or scratching the area. Patient reported 2 episode of breast abscess before. First episode had sponatenous drainage. Second episode was around 7 years ago and was drained in urgent care.   Febrile to 100.8, tachycardic. WBC 13.45, plt 283, Cr 0.81, lactate 1.3. Flu/COVID negative. CXR with no focal consolidation    ##Acute sepsis   ##Left breast abscess   Febrile, tachycardic, leukocytosis, left breast abscess---> meet sepsis criteria. CXR reviewed with no focal consolidation. S/P left breast abscess drainage in ED on 12/27. Abscess culture unspeciated. Gen Surg consulted no intervention needed.   - C/w vancomycin, Zosyn; Monitor vanco trough, therapeutic monitoring is necessary with IV vancomycin  - Follow up blood and abscess culture  - C/w Fluids   - Pending HIV test   - Oxy for pain control   - Surg and ID consulted.     ##Type 2 diabetes mellitus with unspecified complications.   ·  Pending A1c; ISS, hypoglycemia protocol    Diet: CCD   DVT prophylaxis: lovenox   Dispo: admit   Code Status: FC     I have spent a total of *** minutes to prepare to see the patient, obtaining and reviewing history, physical examination, explaining the diagnosis, prognosis and treatment plan with the patient/family/caregiver. I also have spent the time ordering studies and testing, interpreting results, medicine reconciliation, IDRs, subspecialty consultation and documentation as above.     54 years old female with h/o DM and breast abscess present to ED with complain of left breast pain/abscess. Patient reported pain/abscess underneath left breast for last few days. Denied any trauma or scratching the area. Patient reported 2 episode of breast abscess before. First episode had sponatenous drainage. Second episode was around 7 years ago and was drained in urgent care.   Febrile to 100.8, tachycardic. WBC 13.45, plt 283, Cr 0.81, lactate 1.3. Flu/COVID negative. CXR with no focal consolidation    ##Acute sepsis   ##Left breast abscess   Febrile, tachycardic, leukocytosis, left breast abscess---> meet sepsis criteria. CXR reviewed with no focal consolidation. S/P left breast abscess drainage in ED on 12/27. Abscess culture unspeciated. Gen Surg consulted no intervention needed.   - C/w vancomycin, Zosyn; Monitor vanco trough, therapeutic monitoring is necessary with IV vancomycin  - Follow up blood and abscess culture  - C/w Fluids   - Pending HIV test   - Oxy for pain control     ##Type 2 diabetes mellitus with unspecified complications.   ·  Pending A1c; ISS, hypoglycemia protocol    Diet: CCD   DVT prophylaxis: lovenox   Dispo: admit   Code Status: FC     I have spent a total of 42 minutes to prepare to see the patient, obtaining and reviewing history, physical examination, explaining the diagnosis, prognosis and treatment plan with the patient/family/caregiver. I also have spent the time ordering studies and testing, interpreting results, medicine reconciliation, IDRs, subspecialty consultation and documentation as above.

## 2024-12-28 NOTE — PROGRESS NOTE ADULT - ASSESSMENT
54F w/ PMHx DM, and multiple breast abscesses admitted with sepsis secondary to left breast abscess.  Abscess drained in ED yesterday, patient reports feeling much better today.  Leukocytosis improved, afebrile.    Plan as discussed with Dr. Josemanuel Craig:  - No further surgical intervention at this time  - Recommend no further packing, continue local wound care covering with dry gauze and tape  - Recommend follow up outpatient with breast surgeon, Dr. Conor King   - Surgery sign off, please reconsult PRN  - Continue care per primary

## 2024-12-28 NOTE — PROGRESS NOTE ADULT - NS ATTEND AMEND GEN_ALL_CORE FT
Incision examined, remains open, minimal drainage, minimal erythema, mild tenderness. Packing removed and wound covered with just gauze and tape. Please have the patient follow up with Dr. Conor King (Breast Surgeon) as outpatient.

## 2024-12-28 NOTE — PROGRESS NOTE ADULT - SUBJECTIVE AND OBJECTIVE BOX
Hospitalist Daily Progress Note     *SUBJECTIVE*    Interval Events:  NAEON, Resting comfortably. HD Stable.      *OBJECTIVE*    PHYSICAL EXAM:  CONSTITUTIONAL: alert and cooperative, no acute distress.   CARDIAC: Normal S1 and S2. Regular rate and rhythms. No Pedal edema. Peripheral pulses intact  LUNGS: Equal air entry both lungs. No rales, rhonchi, wheezing. Normal respiratory effort.   ABDOMEN: Soft, nondistended, nontender. No guarding or rebound tenderness. No hepatomegaly or splenomegaly. Bowel sound normal.     OBJECTIVE DATA:   Vital Signs Last 24 Hrs  T(C): 36.6 (28 Dec 2024 05:44), Max: 38.2 (27 Dec 2024 09:51)  T(F): 97.8 (28 Dec 2024 05:44), Max: 100.8 (27 Dec 2024 09:51)  HR: 115 (28 Dec 2024 05:44) (92 - 120)  BP: 114/80 (28 Dec 2024 05:44) (106/89 - 133/65)  BP(mean): --  RR: 18 (28 Dec 2024 05:44) (18 - 21)  SpO2: 91% (28 Dec 2024 05:44) (91% - 99%)    Parameters below as of 28 Dec 2024 05:44  Patient On (Oxygen Delivery Method): room air               Daily Height in cm: 165.1 (27 Dec 2024 09:51)    Daily Weight in k.5 (27 Dec 2024 17:55)    Labs, Interval Radiology studies, Medications reviewed by me        
Patient seen and examined bedside with Dr. Josemanuel Craig.  Reports feeling much better today  Reporting some pain controlled with oral medication.  Denies nausea and vomiting, fever, chills, chest pain, dyspnea, cough.    T(F): 97.8 (12-28-24 @ 05:44), Max: 99.4 (12-27-24 @ 16:17)  HR: 115 (12-28-24 @ 05:44) (92 - 120)  BP: 114/80 (12-28-24 @ 05:44) (106/89 - 133/65)  RR: 18 (12-28-24 @ 05:44) (18 - 21)  SpO2: 91% (12-28-24 @ 05:44) (91% - 98%)  Wt(kg): --  CAPILLARY BLOOD GLUCOSE      POCT Blood Glucose.: 395 mg/dL (28 Dec 2024 12:13)  POCT Blood Glucose.: 313 mg/dL (28 Dec 2024 10:57)  POCT Blood Glucose.: 287 mg/dL (28 Dec 2024 07:31)  POCT Blood Glucose.: 258 mg/dL (27 Dec 2024 21:09)  POCT Blood Glucose.: 306 mg/dL (27 Dec 2024 17:13)      PHYSICAL EXAM:  General: NAD  Neuro:  Alert & oriented x 3  HEENT: NCAT, EOMI, conjunctiva clear  Chest:  Packing and dressing removed. Small 1cm incision on inferior left breast without surround erythema or active drainage. Incision covered with dry gauze and tape.  Lung: respirations nonlabored, good inspiratory effort  Abdomen: soft, NTND.   Extremities: no pedal edema or calf tenderness noted     LABS:                        12.6   8.53  )-----------( 274      ( 28 Dec 2024 09:55 )             37.7     12-28    132[L]  |  98  |  10  ----------------------------<  317[H]  3.6   |  30  |  0.89    Ca    9.1      28 Dec 2024 09:55  Phos  3.5     12-28  Mg     1.9     12-28    TPro  7.5  /  Alb  2.8[L]  /  TBili  0.3  /  DBili  x   /  AST  17  /  ALT  25  /  AlkPhos  83  12-28    PT/INR - ( 27 Dec 2024 11:50 )   PT: 12.1 sec;   INR: 1.04 ratio         PTT - ( 27 Dec 2024 11:50 )  PTT:32.9 sec    I&O:     Culture Results:   No growth to date. *!* (12-27 @ 14:20)

## 2024-12-29 VITALS
RESPIRATION RATE: 17 BRPM | OXYGEN SATURATION: 95 % | TEMPERATURE: 98 F | HEART RATE: 99 BPM | SYSTOLIC BLOOD PRESSURE: 115 MMHG | DIASTOLIC BLOOD PRESSURE: 76 MMHG

## 2024-12-29 LAB
ANION GAP SERPL CALC-SCNC: 6 MMOL/L — SIGNIFICANT CHANGE UP (ref 5–17)
BASOPHILS # BLD AUTO: 0.04 K/UL — SIGNIFICANT CHANGE UP (ref 0–0.2)
BASOPHILS NFR BLD AUTO: 0.6 % — SIGNIFICANT CHANGE UP (ref 0–2)
BUN SERPL-MCNC: 9 MG/DL — SIGNIFICANT CHANGE UP (ref 7–23)
CALCIUM SERPL-MCNC: 9.2 MG/DL — SIGNIFICANT CHANGE UP (ref 8.5–10.1)
CHLORIDE SERPL-SCNC: 98 MMOL/L — SIGNIFICANT CHANGE UP (ref 96–108)
CO2 SERPL-SCNC: 30 MMOL/L — SIGNIFICANT CHANGE UP (ref 22–31)
CREAT SERPL-MCNC: 0.72 MG/DL — SIGNIFICANT CHANGE UP (ref 0.5–1.3)
EGFR: 98 ML/MIN/1.73M2 — SIGNIFICANT CHANGE UP
EOSINOPHIL # BLD AUTO: 0.35 K/UL — SIGNIFICANT CHANGE UP (ref 0–0.5)
EOSINOPHIL NFR BLD AUTO: 5.2 % — SIGNIFICANT CHANGE UP (ref 0–6)
GLUCOSE BLDC GLUCOMTR-MCNC: 331 MG/DL — HIGH (ref 70–99)
GLUCOSE BLDC GLUCOMTR-MCNC: 343 MG/DL — HIGH (ref 70–99)
GLUCOSE SERPL-MCNC: 293 MG/DL — HIGH (ref 70–99)
HCT VFR BLD CALC: 38.3 % — SIGNIFICANT CHANGE UP (ref 34.5–45)
HGB BLD-MCNC: 12.7 G/DL — SIGNIFICANT CHANGE UP (ref 11.5–15.5)
IMM GRANULOCYTES NFR BLD AUTO: 0.6 % — SIGNIFICANT CHANGE UP (ref 0–0.9)
LYMPHOCYTES # BLD AUTO: 2.05 K/UL — SIGNIFICANT CHANGE UP (ref 1–3.3)
LYMPHOCYTES # BLD AUTO: 30.3 % — SIGNIFICANT CHANGE UP (ref 13–44)
MAGNESIUM SERPL-MCNC: 1.8 MG/DL — SIGNIFICANT CHANGE UP (ref 1.6–2.6)
MCHC RBC-ENTMCNC: 29.7 PG — SIGNIFICANT CHANGE UP (ref 27–34)
MCHC RBC-ENTMCNC: 33.2 G/DL — SIGNIFICANT CHANGE UP (ref 32–36)
MCV RBC AUTO: 89.5 FL — SIGNIFICANT CHANGE UP (ref 80–100)
MONOCYTES # BLD AUTO: 0.49 K/UL — SIGNIFICANT CHANGE UP (ref 0–0.9)
MONOCYTES NFR BLD AUTO: 7.2 % — SIGNIFICANT CHANGE UP (ref 2–14)
NEUTROPHILS # BLD AUTO: 3.8 K/UL — SIGNIFICANT CHANGE UP (ref 1.8–7.4)
NEUTROPHILS NFR BLD AUTO: 56.1 % — SIGNIFICANT CHANGE UP (ref 43–77)
NRBC # BLD: 0 /100 WBCS — SIGNIFICANT CHANGE UP (ref 0–0)
PLATELET # BLD AUTO: 290 K/UL — SIGNIFICANT CHANGE UP (ref 150–400)
POTASSIUM SERPL-MCNC: 3.8 MMOL/L — SIGNIFICANT CHANGE UP (ref 3.5–5.3)
POTASSIUM SERPL-SCNC: 3.8 MMOL/L — SIGNIFICANT CHANGE UP (ref 3.5–5.3)
RBC # BLD: 4.28 M/UL — SIGNIFICANT CHANGE UP (ref 3.8–5.2)
RBC # FLD: 12.6 % — SIGNIFICANT CHANGE UP (ref 10.3–14.5)
SODIUM SERPL-SCNC: 134 MMOL/L — LOW (ref 135–145)
WBC # BLD: 6.77 K/UL — SIGNIFICANT CHANGE UP (ref 3.8–10.5)
WBC # FLD AUTO: 6.77 K/UL — SIGNIFICANT CHANGE UP (ref 3.8–10.5)

## 2024-12-29 PROCEDURE — 99239 HOSP IP/OBS DSCHRG MGMT >30: CPT

## 2024-12-29 RX ORDER — SULFAMETHOXAZOLE/TRIMETHOPRIM 800-160 MG
1 TABLET ORAL
Qty: 20 | Refills: 0
Start: 2024-12-29 | End: 2025-01-07

## 2024-12-29 RX ORDER — SODIUM CHLORIDE 9 MG/ML
1000 INJECTION, SOLUTION INTRAVENOUS ONCE
Refills: 0 | Status: COMPLETED | OUTPATIENT
Start: 2024-12-29 | End: 2024-12-29

## 2024-12-29 RX ADMIN — PIPERACILLIN AND TAZOBACTAM 25 GRAM(S): 3; .375 INJECTION, POWDER, LYOPHILIZED, FOR SOLUTION INTRAVENOUS at 06:44

## 2024-12-29 RX ADMIN — SODIUM CHLORIDE 1000 MILLILITER(S): 9 INJECTION, SOLUTION INTRAVENOUS at 10:38

## 2024-12-29 RX ADMIN — VANCOMYCIN HYDROCHLORIDE 250 MILLIGRAM(S): 5 INJECTION, POWDER, LYOPHILIZED, FOR SOLUTION INTRAVENOUS at 00:32

## 2024-12-29 RX ADMIN — ENOXAPARIN SODIUM 40 MILLIGRAM(S): 60 INJECTION INTRAVENOUS; SUBCUTANEOUS at 11:53

## 2024-12-29 RX ADMIN — Medication 4: at 08:05

## 2024-12-29 RX ADMIN — VANCOMYCIN HYDROCHLORIDE 250 MILLIGRAM(S): 5 INJECTION, POWDER, LYOPHILIZED, FOR SOLUTION INTRAVENOUS at 11:56

## 2024-12-29 RX ADMIN — Medication 4: at 11:49

## 2024-12-29 NOTE — DISCHARGE NOTE PROVIDER - NSDCMRMEDTOKEN_GEN_ALL_CORE_FT
Bactrim  mg-160 mg oral tablet: 1 tab(s) orally 2 times a day  metFORMIN 850 mg oral tablet: 1 tab(s) orally 2 times a day

## 2024-12-29 NOTE — DISCHARGE NOTE PROVIDER - HOSPITAL COURSE
54 years old female with h/o DM and breast abscess present to ED with complain of left breast pain/abscess. Patient reported pain/abscess underneath left breast for last few days. Denied any trauma or scratching the area. Patient reported 2 episode of breast abscess before. First episode had sponatenous drainage. Second episode was around 7 years ago and was drained in urgent care.   Febrile to 100.8, tachycardic. WBC 13.45, plt 283, Cr 0.81, lactate 1.3. Flu/COVID negative. CXR with no focal consolidation    ##Acute sepsis   ##Left breast abscess   Febrile, tachycardic, leukocytosis, left breast abscess---> meet sepsis criteria. CXR reviewed with no focal consolidation. S/P left breast abscess drainage in ED on 12/27. Abscess culture unspeciated. Gen Surg consulted no intervention needed. Will follow up with her primary breast surgeon Dr. King. Will change antibiotics to Bactrim DS x10 days.     ##Type 2 diabetes mellitus with unspecified complications.   ·  A1c 11.3; Discussed insulin initiation with patient. She prefers to start insulin when she sees her PCP next week.     Stable for D/c home with Surg and PCP followup

## 2024-12-29 NOTE — DISCHARGE NOTE PROVIDER - PROVIDER TOKENS
FREE:[LAST:[PCP],PHONE:[(   )    -],FAX:[(   )    -]],PROVIDER:[TOKEN:[[id number]],FOLLOWUP:[1 week]]

## 2024-12-29 NOTE — DISCHARGE NOTE PROVIDER - CARE PROVIDER_API CALL
PCP,   Phone: (   )    -  Fax: (   )    -  Follow Up Time:     Conor King  Surgery  [address] [facility], [address] [place] [id number]  Phone: [phone]  Fax: [phone]  Follow Up Time: 1 week

## 2024-12-29 NOTE — DISCHARGE NOTE NURSING/CASE MANAGEMENT/SOCIAL WORK - FINANCIAL ASSISTANCE
[facility] provides services at a reduced cost to those who are determined to be eligible through [facility]’s financial assistance program. Information regarding [facility]’s financial assistance program can be found by going to [email or web address] or by calling [phone].

## 2024-12-29 NOTE — DISCHARGE NOTE PROVIDER - ATTENDING DISCHARGE PHYSICAL EXAMINATION:
145
CONSTITUTIONAL: alert and cooperative, no acute distress.   CARDIAC: Normal S1 and S2. Regular rate and rhythms. No Pedal edema. Peripheral pulses intact  LUNGS: Equal air entry both lungs. No rales, rhonchi, wheezing. Normal respiratory effort.   ABDOMEN: Soft, nondistended, nontender. No guarding or rebound tenderness. No hepatomegaly or splenomegaly. Bowel sound normal.

## 2024-12-29 NOTE — DISCHARGE NOTE PROVIDER - NSDCCPCAREPLAN_GEN_ALL_CORE_FT
PRINCIPAL DISCHARGE DIAGNOSIS  Diagnosis: Left breast abscess  Assessment and Plan of Treatment: please finish antibiotic course  please follow up with PCP and Surgeon      SECONDARY DISCHARGE DIAGNOSES  Diagnosis: Diabetes mellitus with hyperglycemia  Assessment and Plan of Treatment:     Diagnosis: Left breast abscess  Assessment and Plan of Treatment:

## 2024-12-29 NOTE — DISCHARGE NOTE NURSING/CASE MANAGEMENT/SOCIAL WORK - PATIENT PORTAL LINK FT
You can access the FollowMyHealth Patient Portal offered by [facility] by registering at the following website: [email or web address]. By joining [facility]’s FollowMyHealth portal, you will also be able to view your health information using other applications (apps) compatible with our system.

## 2025-01-01 LAB
CULTURE RESULTS: SIGNIFICANT CHANGE UP
CULTURE RESULTS: SIGNIFICANT CHANGE UP
SPECIMEN SOURCE: SIGNIFICANT CHANGE UP
SPECIMEN SOURCE: SIGNIFICANT CHANGE UP

## 2025-01-04 LAB
CULTURE RESULTS: ABNORMAL
GRAM STN FLD: ABNORMAL
SPECIMEN SOURCE: SIGNIFICANT CHANGE UP

## 2025-01-06 DIAGNOSIS — N61.1 ABSCESS OF THE BREAST AND NIPPLE: ICD-10-CM

## 2025-01-06 DIAGNOSIS — A41.9 SEPSIS, UNSPECIFIED ORGANISM: ICD-10-CM

## 2025-01-06 DIAGNOSIS — E11.65 TYPE 2 DIABETES MELLITUS WITH HYPERGLYCEMIA: ICD-10-CM

## 2025-04-29 ENCOUNTER — INPATIENT (INPATIENT)
Facility: HOSPITAL | Age: 55
LOS: 1 days | Discharge: HOME | DRG: 351 | End: 2025-05-01
Attending: INTERNAL MEDICINE | Admitting: STUDENT IN AN ORGANIZED HEALTH CARE EDUCATION/TRAINING PROGRAM
Payer: MEDICAID

## 2025-04-29 VITALS
DIASTOLIC BLOOD PRESSURE: 79 MMHG | OXYGEN SATURATION: 95 % | HEART RATE: 139 BPM | RESPIRATION RATE: 20 BRPM | TEMPERATURE: 103 F | WEIGHT: 169.98 LBS | SYSTOLIC BLOOD PRESSURE: 130 MMHG

## 2025-04-29 DIAGNOSIS — M60.9 MYOSITIS, UNSPECIFIED: ICD-10-CM

## 2025-04-29 LAB
ALBUMIN SERPL ELPH-MCNC: 3.9 G/DL — SIGNIFICANT CHANGE UP (ref 3.5–5.2)
ALP SERPL-CCNC: 111 U/L — SIGNIFICANT CHANGE UP (ref 30–115)
ALT FLD-CCNC: 37 U/L — SIGNIFICANT CHANGE UP (ref 0–41)
ANION GAP SERPL CALC-SCNC: 15 MMOL/L — HIGH (ref 7–14)
APPEARANCE UR: ABNORMAL
APTT BLD: 37 SEC — SIGNIFICANT CHANGE UP (ref 27–39.2)
AST SERPL-CCNC: 33 U/L — SIGNIFICANT CHANGE UP (ref 0–41)
BACTERIA # UR AUTO: ABNORMAL /HPF
BASOPHILS # BLD AUTO: 0 K/UL — SIGNIFICANT CHANGE UP (ref 0–0.2)
BASOPHILS NFR BLD AUTO: 0 % — SIGNIFICANT CHANGE UP (ref 0–1)
BILIRUB SERPL-MCNC: 0.4 MG/DL — SIGNIFICANT CHANGE UP (ref 0.2–1.2)
BILIRUB UR-MCNC: NEGATIVE — SIGNIFICANT CHANGE UP
BUN SERPL-MCNC: 15 MG/DL — SIGNIFICANT CHANGE UP (ref 10–20)
CALCIUM SERPL-MCNC: 8.8 MG/DL — SIGNIFICANT CHANGE UP (ref 8.4–10.5)
CAST: 6 /LPF — HIGH (ref 0–4)
CHLORIDE SERPL-SCNC: 88 MMOL/L — LOW (ref 98–110)
CK SERPL-CCNC: 180 U/L — SIGNIFICANT CHANGE UP (ref 0–225)
CO2 SERPL-SCNC: 23 MMOL/L — SIGNIFICANT CHANGE UP (ref 17–32)
COLOR SPEC: YELLOW — SIGNIFICANT CHANGE UP
CREAT SERPL-MCNC: 1.3 MG/DL — SIGNIFICANT CHANGE UP (ref 0.7–1.5)
DIFF PNL FLD: ABNORMAL
EGFR: 48 ML/MIN/1.73M2 — LOW
EGFR: 48 ML/MIN/1.73M2 — LOW
EOSINOPHIL # BLD AUTO: 0 K/UL — SIGNIFICANT CHANGE UP (ref 0–0.7)
EOSINOPHIL NFR BLD AUTO: 0 % — SIGNIFICANT CHANGE UP (ref 0–8)
GIANT PLATELETS BLD QL SMEAR: PRESENT — SIGNIFICANT CHANGE UP
GLUCOSE BLDC GLUCOMTR-MCNC: 449 MG/DL — HIGH (ref 70–99)
GLUCOSE SERPL-MCNC: 318 MG/DL — HIGH (ref 70–99)
GLUCOSE UR QL: 250 MG/DL
HCT VFR BLD CALC: 39.7 % — SIGNIFICANT CHANGE UP (ref 37–47)
HGB BLD-MCNC: 13.5 G/DL — SIGNIFICANT CHANGE UP (ref 12–16)
INR BLD: 1.12 RATIO — SIGNIFICANT CHANGE UP (ref 0.65–1.3)
KETONES UR-MCNC: 15 MG/DL
LACTATE SERPL-SCNC: 1.8 MMOL/L — SIGNIFICANT CHANGE UP (ref 0.7–2)
LEUKOCYTE ESTERASE UR-ACNC: ABNORMAL
LYMPHOCYTES # BLD AUTO: 0.97 K/UL — LOW (ref 1.2–3.4)
LYMPHOCYTES # BLD AUTO: 4 % — LOW (ref 20.5–51.1)
MANUAL SMEAR VERIFICATION: SIGNIFICANT CHANGE UP
MCHC RBC-ENTMCNC: 30.2 PG — SIGNIFICANT CHANGE UP (ref 27–31)
MCHC RBC-ENTMCNC: 34 G/DL — SIGNIFICANT CHANGE UP (ref 32–37)
MCV RBC AUTO: 88.8 FL — SIGNIFICANT CHANGE UP (ref 81–99)
METAMYELOCYTES # FLD: 1 % — HIGH (ref 0–0)
METAMYELOCYTES NFR BLD: 1 % — HIGH (ref 0–0)
MONOCYTES # BLD AUTO: 1.68 K/UL — HIGH (ref 0.1–0.6)
MONOCYTES NFR BLD AUTO: 6.9 % — SIGNIFICANT CHANGE UP (ref 1.7–9.3)
NEUTROPHILS # BLD AUTO: 20.7 K/UL — HIGH (ref 1.4–6.5)
NEUTROPHILS NFR BLD AUTO: 80.2 % — HIGH (ref 42.2–75.2)
NEUTS BAND # BLD: 4.9 % — SIGNIFICANT CHANGE UP (ref 0–6)
NEUTS BAND NFR BLD: 4.9 % — SIGNIFICANT CHANGE UP (ref 0–6)
NITRITE UR-MCNC: NEGATIVE — SIGNIFICANT CHANGE UP
PH UR: 6 — SIGNIFICANT CHANGE UP (ref 5–8)
PLAT MORPH BLD: NORMAL — SIGNIFICANT CHANGE UP
PLATELET # BLD AUTO: 270 K/UL — SIGNIFICANT CHANGE UP (ref 130–400)
PMV BLD: 10.7 FL — HIGH (ref 7.4–10.4)
POTASSIUM SERPL-MCNC: 4 MMOL/L — SIGNIFICANT CHANGE UP (ref 3.5–5)
POTASSIUM SERPL-SCNC: 4 MMOL/L — SIGNIFICANT CHANGE UP (ref 3.5–5)
PROT SERPL-MCNC: 8.3 G/DL — HIGH (ref 6–8)
PROT UR-MCNC: 100 MG/DL
PROTHROM AB SERPL-ACNC: 13.2 SEC — HIGH (ref 9.95–12.87)
RBC # BLD: 4.47 M/UL — SIGNIFICANT CHANGE UP (ref 4.2–5.4)
RBC # FLD: 12.6 % — SIGNIFICANT CHANGE UP (ref 11.5–14.5)
RBC BLD AUTO: NORMAL — SIGNIFICANT CHANGE UP
RBC CASTS # UR COMP ASSIST: 8 /HPF — HIGH (ref 0–4)
SMUDGE CELLS # BLD: PRESENT — SIGNIFICANT CHANGE UP
SODIUM SERPL-SCNC: 126 MMOL/L — LOW (ref 135–146)
SP GR SPEC: 1.02 — SIGNIFICANT CHANGE UP (ref 1–1.03)
SQUAMOUS # UR AUTO: 5 /HPF — SIGNIFICANT CHANGE UP (ref 0–5)
TRICHOMONAS #/AREA UR COMP ASSIST: PRESENT
UROBILINOGEN FLD QL: 1 MG/DL — SIGNIFICANT CHANGE UP (ref 0.2–1)
VARIANT LYMPHS # BLD: 3 % — SIGNIFICANT CHANGE UP (ref 0–5)
VARIANT LYMPHS NFR BLD MANUAL: 3 % — SIGNIFICANT CHANGE UP (ref 0–5)
WBC # BLD: 24.33 K/UL — HIGH (ref 4.8–10.8)
WBC # FLD AUTO: 24.33 K/UL — HIGH (ref 4.8–10.8)
WBC UR QL: 117 /HPF — HIGH (ref 0–5)

## 2025-04-29 RX ORDER — METRONIDAZOLE 250 MG
500 TABLET ORAL EVERY 12 HOURS
Refills: 0 | Status: DISCONTINUED | OUTPATIENT
Start: 2025-04-29 | End: 2025-05-01

## 2025-04-29 RX ORDER — INSULIN GLARGINE-YFGN 100 [IU]/ML
14 INJECTION, SOLUTION SUBCUTANEOUS AT BEDTIME
Refills: 0 | Status: DISCONTINUED | OUTPATIENT
Start: 2025-04-29 | End: 2025-05-01

## 2025-04-29 RX ORDER — INSULIN LISPRO 100 U/ML
7 INJECTION, SOLUTION INTRAVENOUS; SUBCUTANEOUS
Refills: 0 | Status: DISCONTINUED | OUTPATIENT
Start: 2025-04-29 | End: 2025-05-01

## 2025-04-29 RX ORDER — GLUCAGON 3 MG/1
1 POWDER NASAL ONCE
Refills: 0 | Status: DISCONTINUED | OUTPATIENT
Start: 2025-04-29 | End: 2025-05-01

## 2025-04-29 RX ORDER — SODIUM CHLORIDE 9 G/1000ML
1000 INJECTION, SOLUTION INTRAVENOUS ONCE
Refills: 0 | Status: COMPLETED | OUTPATIENT
Start: 2025-04-29 | End: 2025-04-29

## 2025-04-29 RX ORDER — ENOXAPARIN SODIUM 100 MG/ML
40 INJECTION SUBCUTANEOUS EVERY 24 HOURS
Refills: 0 | Status: DISCONTINUED | OUTPATIENT
Start: 2025-04-29 | End: 2025-05-01

## 2025-04-29 RX ORDER — VANCOMYCIN HCL IN 5 % DEXTROSE 1.5G/250ML
1250 PLASTIC BAG, INJECTION (ML) INTRAVENOUS EVERY 24 HOURS
Refills: 0 | Status: DISCONTINUED | OUTPATIENT
Start: 2025-04-30 | End: 2025-04-30

## 2025-04-29 RX ORDER — VANCOMYCIN HCL IN 5 % DEXTROSE 1.5G/250ML
1000 PLASTIC BAG, INJECTION (ML) INTRAVENOUS ONCE
Refills: 0 | Status: COMPLETED | OUTPATIENT
Start: 2025-04-29 | End: 2025-04-29

## 2025-04-29 RX ORDER — INSULIN LISPRO 100 U/ML
INJECTION, SOLUTION INTRAVENOUS; SUBCUTANEOUS
Refills: 0 | Status: DISCONTINUED | OUTPATIENT
Start: 2025-04-29 | End: 2025-05-01

## 2025-04-29 RX ORDER — DEXTROSE 50 % IN WATER 50 %
25 SYRINGE (ML) INTRAVENOUS ONCE
Refills: 0 | Status: DISCONTINUED | OUTPATIENT
Start: 2025-04-29 | End: 2025-05-01

## 2025-04-29 RX ORDER — ACETAMINOPHEN 500 MG/5ML
650 LIQUID (ML) ORAL ONCE
Refills: 0 | Status: COMPLETED | OUTPATIENT
Start: 2025-04-29 | End: 2025-04-29

## 2025-04-29 RX ORDER — SODIUM CHLORIDE 9 G/1000ML
1000 INJECTION, SOLUTION INTRAVENOUS
Refills: 0 | Status: DISCONTINUED | OUTPATIENT
Start: 2025-04-29 | End: 2025-05-01

## 2025-04-29 RX ORDER — SODIUM CHLORIDE 9 G/1000ML
2400 INJECTION, SOLUTION INTRAVENOUS ONCE
Refills: 0 | Status: COMPLETED | OUTPATIENT
Start: 2025-04-29 | End: 2025-04-29

## 2025-04-29 RX ORDER — ACETAMINOPHEN 500 MG/5ML
650 LIQUID (ML) ORAL EVERY 6 HOURS
Refills: 0 | Status: DISCONTINUED | OUTPATIENT
Start: 2025-04-29 | End: 2025-05-01

## 2025-04-29 RX ORDER — SODIUM CHLORIDE 9 G/1000ML
1000 INJECTION, SOLUTION INTRAVENOUS ONCE
Refills: 0 | Status: DISCONTINUED | OUTPATIENT
Start: 2025-04-29 | End: 2025-04-29

## 2025-04-29 RX ORDER — CEFEPIME 2 G/20ML
2000 INJECTION, POWDER, FOR SOLUTION INTRAVENOUS ONCE
Refills: 0 | Status: COMPLETED | OUTPATIENT
Start: 2025-04-29 | End: 2025-04-29

## 2025-04-29 RX ORDER — CEFEPIME 2 G/20ML
2000 INJECTION, POWDER, FOR SOLUTION INTRAVENOUS EVERY 12 HOURS
Refills: 0 | Status: DISCONTINUED | OUTPATIENT
Start: 2025-04-29 | End: 2025-04-30

## 2025-04-29 RX ORDER — DEXTROSE 50 % IN WATER 50 %
15 SYRINGE (ML) INTRAVENOUS ONCE
Refills: 0 | Status: DISCONTINUED | OUTPATIENT
Start: 2025-04-29 | End: 2025-05-01

## 2025-04-29 RX ORDER — DEXTROSE 50 % IN WATER 50 %
12.5 SYRINGE (ML) INTRAVENOUS ONCE
Refills: 0 | Status: DISCONTINUED | OUTPATIENT
Start: 2025-04-29 | End: 2025-05-01

## 2025-04-29 RX ORDER — ASPIRIN 325 MG
1 TABLET ORAL
Refills: 0 | DISCHARGE

## 2025-04-29 RX ORDER — CLINDAMYCIN PHOSPHATE 150 MG/ML
900 VIAL (ML) INJECTION EVERY 8 HOURS
Refills: 0 | Status: DISCONTINUED | OUTPATIENT
Start: 2025-04-29 | End: 2025-04-30

## 2025-04-29 RX ADMIN — Medication 250 MILLIGRAM(S): at 17:20

## 2025-04-29 RX ADMIN — SODIUM CHLORIDE 1000 MILLILITER(S): 9 INJECTION, SOLUTION INTRAVENOUS at 16:30

## 2025-04-29 RX ADMIN — SODIUM CHLORIDE 2400 MILLILITER(S): 9 INJECTION, SOLUTION INTRAVENOUS at 18:30

## 2025-04-29 RX ADMIN — Medication 4 MILLIGRAM(S): at 15:37

## 2025-04-29 RX ADMIN — SODIUM CHLORIDE 1000 MILLILITER(S): 9 INJECTION, SOLUTION INTRAVENOUS at 15:37

## 2025-04-29 RX ADMIN — Medication 4 MILLIGRAM(S): at 21:51

## 2025-04-29 RX ADMIN — SODIUM CHLORIDE 2400 MILLILITER(S): 9 INJECTION, SOLUTION INTRAVENOUS at 16:21

## 2025-04-29 RX ADMIN — Medication 75 MILLILITER(S): at 23:12

## 2025-04-29 RX ADMIN — INSULIN GLARGINE-YFGN 14 UNIT(S): 100 INJECTION, SOLUTION SUBCUTANEOUS at 23:11

## 2025-04-29 RX ADMIN — Medication 500 MILLIGRAM(S): at 21:51

## 2025-04-29 RX ADMIN — Medication 650 MILLIGRAM(S): at 16:29

## 2025-04-29 RX ADMIN — CEFEPIME 100 MILLIGRAM(S): 2 INJECTION, POWDER, FOR SOLUTION INTRAVENOUS at 16:22

## 2025-04-29 RX ADMIN — Medication 166.67 MILLIGRAM(S): at 23:11

## 2025-04-29 RX ADMIN — ENOXAPARIN SODIUM 40 MILLIGRAM(S): 100 INJECTION SUBCUTANEOUS at 23:11

## 2025-04-29 NOTE — H&P ADULT - ATTENDING COMMENTS
Patient seen and examined at bedside independently of the residents. I read the resident's note and agree with the plan with the additions and corrections as noted below. My note supersedes the resident's note.     REVIEW OF SYSTEMS:  Negative except in HPI.    PMH: DM II, Hx of breast abscess (drained in )    FHx: Reviewed. No fhx of asthma/copd, No fhx of liver and pulmonary disease. No fhx of hematological disorder.     Physical Exam:  GEN: No acute distress. Awake, Alert and oriented x 3.   Head: Atraumatic, Normocephalic.   Eye: PEERLA. No sclera icterus. EOMI.   ENT: Normal oropharynx, no thyromegaly, no mass, no lymphadenopathy.   LUNGS: Clear to auscultation bilaterally. No wheeze/rales/crackles.   HEART: Normal. S1/S2 present. RRR. No murmur/gallops. Left sided chest tenderness.   ABD: Soft, non-tender, non-distended. Bowel sounds present.   EXT: No pitting edema. No erythema. No tenderness.  Integumentary: No rash, No sore, No petechia.   NEURO: CN III-XII intact. Strength: 5/5 b/l ULE. Sensory intact b/l ULE.     Vital Signs Last 24 Hrs  T(C): 36.7 (2025 23:07), Max: 39.2 (2025 13:23)  T(F): 98.1 (2025 23:07), Max: 102.5 (2025 13:23)  HR: 77 (2025 23:07) (77 - 139)  BP: 121/66 (2025 23:07) (115/68 - 130/79)  BP(mean): --  RR: 18 (2025 16:45) (18 - 20)  SpO2: 95% (2025 16:45) (95% - 95%)    Parameters below as of 2025 13:23  Patient On (Oxygen Delivery Method): room air      Please see the above notes for Labs and radiology.     Assessment and Plan:     55 yo F with hx of DM II, Hx of breast abscess (drained in ) presents to ED for left breast pain the past few days.     Sepsis 2/2 complicated UTI vs. Infectious myositis of left breast   - CT chest shows Slight relative enlargement of left pectoralis major muscle with mild adjacent fat stranding. Considerations include injury and myositis. No abscess.  - Vanc, Cefepime and Clindamycin for now.   - flagyl for trichomonas   - follow up BCx, ESR, CRP and LDH  - ID consult.   - Surgery evaluation.      DURGA   - c/w NS @ 75cc/hr   - avoid nephrotoxic drugs.   - monitor BMP.     DM II - monitor FS AC HS. Insulin regimen.     DVT ppx: Lovenox SC  GI ppx: not indicated.   Diet: CC diet  Activity: as tolerated.     Date seen by the attendin2025  I have spent 75 minutes of time on the encounter which excludes teaching and/or separately reported services.

## 2025-04-29 NOTE — PATIENT PROFILE ADULT - FALL HARM RISK - RISK INTERVENTIONS

## 2025-04-29 NOTE — ED PROVIDER NOTE - CLINICAL SUMMARY MEDICAL DECISION MAKING FREE TEXT BOX
Patient admitted for sepsis secondary to cellulitis versus myositis.  IV antibiotics started.  Pain control provided.

## 2025-04-29 NOTE — ED PROVIDER NOTE - RHYTHM STRIP/ULTRASOUND IMAGES/CT SCAN IMAGES SHOWED
Independent interpretation of the CT scan as a preliminary reading by Dr. Orion García shows left breast fat stranding

## 2025-04-29 NOTE — ED PROVIDER NOTE - PHYSICAL EXAMINATION
VITAL SIGNS: I have reviewed nursing notes and confirm.  CONSTITUTIONAL: ill-appearing, non-toxic, NAD  SKIN: Warm dry, normal skin turgor, no acute rash, no bruising  HEAD: NCAT  EYES: EOMI, PERRLA, no scleral icterus, normal conjunctiva  ENT: Moist mucous membranes, OR clear. Normal pharynx  NECK: Supple; non tender. Full ROM. No cervical LAD  CARD: RRR, no murmurs, rubs or gallops  Breast exam (performed with attending physician present): No nipple discharge, left upper breast TTP, no fluctuance present.   RESP: clear to ausculation b/l.  No rales, rhonchi, or wheezing. No increased WOB.  ABD: soft, + BS, non-tender, non-distended, no rebound or guarding. No CVA tenderness  EXT: Full ROM, no bony tenderness, pulses intact in bilateral UE and LE, no pedal edema, no calf tenderness  NEURO: normal motor. normal sensory. Normal gait.  PSYCH: Cooperative, appropriate.

## 2025-04-29 NOTE — ED ADULT TRIAGE NOTE - CHIEF COMPLAINT QUOTE
Patient BIBEMS for left sided breast pain after procedure in december, patient complaining of pain under breast with foul smell

## 2025-04-29 NOTE — PROVIDER CONTACT NOTE (OTHER) - ACTION/TREATMENT ORDERED:
Per MD, administer lantus 14 units. No orders of lispro, recheck fs in 1 hour. No interventions at this time per MD. Will update as needed.

## 2025-04-29 NOTE — H&P ADULT - HISTORY OF PRESENT ILLNESS
Patient is a 54-year-old F with PMHx of IDDM (non compliant with home medications last 2 to 3 days), hx of breast abscess drained in 2024 who presents for left breast pain ongoing for a few days. She denies any nipple discharge, chest pain, shortness breath, palpitations, nausea, vomiting, diarrhea, abdominal pain, trauma.    Vitals in ED: T 102.5, , /79, RR 20, Sat 95% on RA  Labs in ED: WBC 24k, Na 126 (corrected for glucose is 129), Glucose 318, Cr 1.3 (baseline unknown) UA grossly positive with Trichomonas-like cells; AST/ALT/CPK all WNL  EKG: sinus tachycardia  Imaging:  CT Chest with IV contrast: Slight relative enlargement of left pectoralis major muscle with mild adjacent fat stranding. Considerations include injury and myositis. No abscess.  Interventions: s/p Morphine 4 mg IVPx1, LR 1L bolus followed by 2400 mL bolus; Vanc 1 g x1, Cefepime 2g x1    Patient admitted to medicine for management of sepsis secondary to UTI and myositis.      Patient is a 54-year-old F with PMHx of IDDM (non compliant with home medications last 2 to 3 days), hx of prior spontaneous left breast abscesses (last of which was Dec 2024, s/p I&D at [facility]) who presents for constant, worsening left breast pain ongoing for three days. She denies any fevers at home, breast or nipple discharge, injury to the breast, chest pain, palpitations, nausea, vomiting, diarrhea, new medications. Patient has a productive cough and reports her son whom she lives with has a viral illness.     Vitals in ED: T 102.5, , /79, RR 20, Sat 95% on RA  Labs in ED: WBC 24k, Na 126 (corrected for glucose is 129), Glucose 318, Cr 1.3 (baseline unknown) UA grossly positive with Trichomonas-like cells; AST/ALT/CPK all WNL  EKG: sinus tachycardia  Imaging:  CT Chest with IV contrast: Slight relative enlargement of left pectoralis major muscle with mild adjacent fat stranding. Considerations include injury and myositis. No abscess.  Interventions: s/p Morphine 4 mg IVPx1, LR 1L bolus followed by 2400 mL bolus; Vanc 1 g x1, Cefepime 2g x1    Patient admitted to medicine for management of sepsis, etiology possibly 2/2 infectious myositis and in setting of UTI.

## 2025-04-29 NOTE — H&P ADULT - NSHPPHYSICALEXAM_GEN_ALL_CORE
GENERAL: sitting upright in bed, in moderate distress 2/2 pain  HEAD:  Atraumatic, normocephalic  EYES: EOMI, PERRLA, conjunctiva and sclera clear  ENT: Moist mucous membranes  NECK: Supple, no JVD  HEART: Regular rate and rhythm, no murmurs, rubs, or gallops  LUNGS: decreased breath sounds b/l, crackles b/l  CHEST: severe left breast muscle TTP and left sided chest muscle TTP superior to breast  ABDOMEN: Soft, nontender, nondistended, +BS  EXTREMITIES: 2+ peripheral pulses bilaterally. No clubbing, cyanosis, or edema  NERVOUS SYSTEM:  A&Ox3, no focal deficits   SKIN: No rashes or lesions

## 2025-04-29 NOTE — ED PROVIDER NOTE - OBJECTIVE STATEMENT
54-year-old female past medical history of diabetes (insulin-dependent) noncompliant with home medications last 2 to 3 days, history of breast abscess presents for left breast pain.  Reports left breast pain past few days.  No nipple discharge, chest pain, shortness breath, palpitations, nausea, vomiting, diarrhea, abdominal pain, trauma. States she had a breast abscess that was drained last year.

## 2025-04-29 NOTE — ED PROVIDER NOTE - EKG/XRAY ADDITIONAL INFORMATION
Sinus tachycardia with a heart rate of 143 MS interval 116 QRS 72 QTc 450 no elevations or depressions normal R wave progression Sinus tachycardia with a heart rate of 143 IA interval 116 QRS 72 QTc 450 no elevations or depressions normal R wave progression    independent interpretation of ED X-Ray films performed by Dr. Orion García shows no infiltrate no ptx

## 2025-04-29 NOTE — H&P ADULT - ASSESSMENT
**THIS NOTE IS INCOMPLETE**    MISC    #DVT prophylaxis: Lovenox  #GI prophylaxis: not indicated  #Diet: CC  #Activity: IAT  #Code status: Full Code  #Disposition: Admit to Medicine   Patient is a 54-year-old F with PMHx of IDDM (non compliant with home medications last 2 to 3 days), hx of prior spontaneous left breast abscesses (last of which was Dec 2024, s/p I&D at [facility]) who presents for constant, worsening left breast pain ongoing for three days. She denies any fevers at home, breast or nipple discharge, injury to the breast, chest pain, palpitations, nausea, vomiting, diarrhea, new medications. Patient has a productive cough and reports her son whom she lives with has a viral illness.     Patient admitted to medicine for management of sepsis, etiology possibly 2/2 infectious myositis and in setting of UTI.     **PLEASE COMPLETE MED REC IN AM, patient does not remember all of her Rx and pharmacy is closed -- [facility] [phone]**     #Left breast pain in setting of hx of prior spontaneous left breast abscesses -- r/o infectious myositis  - SIRS criteria met on admission - T 102.5, ,  WBC 24k --> s/p vanc, cefepime, 3400 mL LR in ED  - CT Chest with IV contrast: Slight relative enlargement of left pectoralis major muscle with mild adjacent fat stranding. Considerations include injury and myositis. No abscess.  - AST/ALT/CPK all WNL  - will c/w Vanc, Cefepime, and add Clinda  - ID consult  - surgery consult to r/o necrotizing infection given rapid onset of infection  - oxycodone 5 mg q6h PRN for moderate pain    #UTI  #DURGA  - UA grossly positive with Trichomonas-like cells (patient is aware that she will need to tell her partner)  - Flagyl 500 mg BID x7 days  - Cr 1.3, baseline unknown -- f/u Cr in AM after IV fluids    #Hyponatremia in setting of hyperglycemia  - Na 126 (corrected for glucose is 129), Glucose 318  - trend BMP, will c/w gentle hydration NS    #IDDM  - patient reports that she takes Metformin 500 mg BID and insulin glargine 14 units qhs  - will start Lispro 7 units TID pre meal, Lantus 14 units qhs, and moderate sliding scale  - f/u HbA1c    MISC  #DVT prophylaxis: Lovenox  #GI prophylaxis: not indicated  #Diet: CC  #Activity: IAT  #Code status: Full Code  #Disposition: Admit to Medicine

## 2025-04-29 NOTE — ED PROVIDER NOTE - ATTENDING CONTRIBUTION TO CARE
54-year-old diabetic who has been noncompliant with her medications for the last 2 to 3 days including metformin but been compliant with her insulin was presenting with increasing left-sided breast pain associated with bodyaches and fever the pain is in the upper portion of the breast anterior without radiation to the back worse with movement but associated with some discharge over the fold of the left breast.  She denies any shortness of breath no leg swelling.  On exam there is a clearish discharge over the fold of the left breast there is tenderness over the left chest wall with some erythema.  There is no lymphadenopathy noted.  The lungs are clear the heart sounds are normal but tachycardic.  Patient is found to be febrile.  Concern for sepsis, IV antibiotics started lab work obtained chest x-ray and CT scan will be obtained

## 2025-04-29 NOTE — H&P ADULT - SOCIAL HISTORY: ALCOHOL USE
hx of alcohol abuse, previously drank 1L alcohol per day 30 years ago, continues to drink wine but does not specify amount

## 2025-04-30 LAB
A1C WITH ESTIMATED AVERAGE GLUCOSE RESULT: 10.1 % — HIGH (ref 4–5.6)
ALBUMIN SERPL ELPH-MCNC: 3.3 G/DL — LOW (ref 3.5–5.2)
ALP SERPL-CCNC: 109 U/L — SIGNIFICANT CHANGE UP (ref 30–115)
ALT FLD-CCNC: 37 U/L — SIGNIFICANT CHANGE UP (ref 0–41)
ANION GAP SERPL CALC-SCNC: 16 MMOL/L — HIGH (ref 7–14)
ANTI-RIBONUCLEAR PROTEIN: 0.7 AI — SIGNIFICANT CHANGE UP
AST SERPL-CCNC: 29 U/L — SIGNIFICANT CHANGE UP (ref 0–41)
BASOPHILS # BLD AUTO: 0.12 K/UL — SIGNIFICANT CHANGE UP (ref 0–0.2)
BASOPHILS NFR BLD AUTO: 0.4 % — SIGNIFICANT CHANGE UP (ref 0–1)
BILIRUB SERPL-MCNC: 0.4 MG/DL — SIGNIFICANT CHANGE UP (ref 0.2–1.2)
BUN SERPL-MCNC: 12 MG/DL — SIGNIFICANT CHANGE UP (ref 10–20)
CALCIUM SERPL-MCNC: 8.9 MG/DL — SIGNIFICANT CHANGE UP (ref 8.4–10.5)
CHLORIDE SERPL-SCNC: 99 MMOL/L — SIGNIFICANT CHANGE UP (ref 98–110)
CO2 SERPL-SCNC: 22 MMOL/L — SIGNIFICANT CHANGE UP (ref 17–32)
CREAT SERPL-MCNC: 0.8 MG/DL — SIGNIFICANT CHANGE UP (ref 0.7–1.5)
CRP SERPL-MCNC: 530.1 MG/L — HIGH
EGFR: 86 ML/MIN/1.73M2 — SIGNIFICANT CHANGE UP
EGFR: 86 ML/MIN/1.73M2 — SIGNIFICANT CHANGE UP
ENA JO1 AB SER-ACNC: <0.2 AI — SIGNIFICANT CHANGE UP
EOSINOPHIL # BLD AUTO: 0.01 K/UL — SIGNIFICANT CHANGE UP (ref 0–0.7)
EOSINOPHIL NFR BLD AUTO: 0 % — SIGNIFICANT CHANGE UP (ref 0–8)
ERYTHROCYTE [SEDIMENTATION RATE] IN BLOOD: 115 MM/HR — HIGH (ref 0–20)
ESTIMATED AVERAGE GLUCOSE: 243 MG/DL — HIGH (ref 68–114)
GLUCOSE BLDC GLUCOMTR-MCNC: 184 MG/DL — HIGH (ref 70–99)
GLUCOSE BLDC GLUCOMTR-MCNC: 203 MG/DL — HIGH (ref 70–99)
GLUCOSE BLDC GLUCOMTR-MCNC: 221 MG/DL — HIGH (ref 70–99)
GLUCOSE BLDC GLUCOMTR-MCNC: 248 MG/DL — HIGH (ref 70–99)
GLUCOSE BLDC GLUCOMTR-MCNC: 332 MG/DL — HIGH (ref 70–99)
GLUCOSE BLDC GLUCOMTR-MCNC: 345 MG/DL — HIGH (ref 70–99)
GLUCOSE SERPL-MCNC: 209 MG/DL — HIGH (ref 70–99)
HCT VFR BLD CALC: 35.7 % — LOW (ref 37–47)
HGB BLD-MCNC: 12.1 G/DL — SIGNIFICANT CHANGE UP (ref 12–16)
IMM GRANULOCYTES NFR BLD AUTO: 2 % — HIGH (ref 0.1–0.3)
LACTATE SERPL-SCNC: 1.1 MMOL/L — SIGNIFICANT CHANGE UP (ref 0.7–2)
LDH SERPL L TO P-CCNC: 283 — HIGH (ref 50–242)
LYMPHOCYTES # BLD AUTO: 1.9 K/UL — SIGNIFICANT CHANGE UP (ref 1.2–3.4)
LYMPHOCYTES # BLD AUTO: 6.6 % — LOW (ref 20.5–51.1)
MAGNESIUM SERPL-MCNC: 1.8 MG/DL — SIGNIFICANT CHANGE UP (ref 1.8–2.4)
MCHC RBC-ENTMCNC: 30.3 PG — SIGNIFICANT CHANGE UP (ref 27–31)
MCHC RBC-ENTMCNC: 33.9 G/DL — SIGNIFICANT CHANGE UP (ref 32–37)
MCV RBC AUTO: 89.5 FL — SIGNIFICANT CHANGE UP (ref 81–99)
MONOCYTES # BLD AUTO: 1.76 K/UL — HIGH (ref 0.1–0.6)
MONOCYTES NFR BLD AUTO: 6.2 % — SIGNIFICANT CHANGE UP (ref 1.7–9.3)
NEUTROPHILS # BLD AUTO: 24.24 K/UL — HIGH (ref 1.4–6.5)
NEUTROPHILS NFR BLD AUTO: 84.8 % — HIGH (ref 42.2–75.2)
NRBC BLD AUTO-RTO: 0 /100 WBCS — SIGNIFICANT CHANGE UP (ref 0–0)
PLATELET # BLD AUTO: 263 K/UL — SIGNIFICANT CHANGE UP (ref 130–400)
PMV BLD: 10.4 FL — SIGNIFICANT CHANGE UP (ref 7.4–10.4)
POTASSIUM SERPL-MCNC: 3.8 MMOL/L — SIGNIFICANT CHANGE UP (ref 3.5–5)
POTASSIUM SERPL-SCNC: 3.8 MMOL/L — SIGNIFICANT CHANGE UP (ref 3.5–5)
PROT SERPL-MCNC: 7.1 G/DL — SIGNIFICANT CHANGE UP (ref 6–8)
RBC # BLD: 3.99 M/UL — LOW (ref 4.2–5.4)
RBC # FLD: 12.6 % — SIGNIFICANT CHANGE UP (ref 11.5–14.5)
SODIUM SERPL-SCNC: 137 MMOL/L — SIGNIFICANT CHANGE UP (ref 135–146)
WBC # BLD: 28.61 K/UL — HIGH (ref 4.8–10.8)
WBC # FLD AUTO: 28.61 K/UL — HIGH (ref 4.8–10.8)

## 2025-04-30 RX ORDER — ATORVASTATIN CALCIUM 80 MG/1
1 TABLET, FILM COATED ORAL
Refills: 0 | DISCHARGE

## 2025-04-30 RX ORDER — METFORMIN HYDROCHLORIDE 850 MG/1
1 TABLET ORAL
Refills: 0 | DISCHARGE

## 2025-04-30 RX ORDER — ATORVASTATIN CALCIUM 80 MG/1
80 TABLET, FILM COATED ORAL AT BEDTIME
Refills: 0 | Status: DISCONTINUED | OUTPATIENT
Start: 2025-04-30 | End: 2025-05-01

## 2025-04-30 RX ORDER — INSULIN GLARGINE-YFGN 100 [IU]/ML
50 INJECTION, SOLUTION SUBCUTANEOUS
Refills: 0 | DISCHARGE

## 2025-04-30 RX ORDER — LINEZOLID 2 MG/ML
600 INJECTION, SOLUTION INTRAVENOUS ONCE
Refills: 0 | Status: COMPLETED | OUTPATIENT
Start: 2025-04-30 | End: 2025-04-30

## 2025-04-30 RX ORDER — ASPIRIN 325 MG
81 TABLET ORAL DAILY
Refills: 0 | Status: DISCONTINUED | OUTPATIENT
Start: 2025-04-30 | End: 2025-04-30

## 2025-04-30 RX ORDER — INSULIN GLARGINE-YFGN 100 [IU]/ML
14 INJECTION, SOLUTION SUBCUTANEOUS
Refills: 0 | DISCHARGE

## 2025-04-30 RX ORDER — ASPIRIN 325 MG
81 TABLET ORAL DAILY
Refills: 0 | Status: DISCONTINUED | OUTPATIENT
Start: 2025-04-30 | End: 2025-05-01

## 2025-04-30 RX ORDER — LINEZOLID 2 MG/ML
INJECTION, SOLUTION INTRAVENOUS
Refills: 0 | Status: DISCONTINUED | OUTPATIENT
Start: 2025-04-30 | End: 2025-05-01

## 2025-04-30 RX ORDER — INSULIN LISPRO 100 U/ML
7 INJECTION, SOLUTION INTRAVENOUS; SUBCUTANEOUS ONCE
Refills: 0 | Status: COMPLETED | OUTPATIENT
Start: 2025-04-30 | End: 2025-04-30

## 2025-04-30 RX ORDER — LINEZOLID 2 MG/ML
600 INJECTION, SOLUTION INTRAVENOUS EVERY 12 HOURS
Refills: 0 | Status: DISCONTINUED | OUTPATIENT
Start: 2025-05-01 | End: 2025-05-01

## 2025-04-30 RX ADMIN — Medication 500 MILLIGRAM(S): at 05:01

## 2025-04-30 RX ADMIN — INSULIN LISPRO 7 UNIT(S): 100 INJECTION, SOLUTION INTRAVENOUS; SUBCUTANEOUS at 12:16

## 2025-04-30 RX ADMIN — Medication 100 MILLIGRAM(S): at 05:01

## 2025-04-30 RX ADMIN — INSULIN LISPRO 7 UNIT(S): 100 INJECTION, SOLUTION INTRAVENOUS; SUBCUTANEOUS at 16:52

## 2025-04-30 RX ADMIN — Medication 1 APPLICATION(S): at 11:48

## 2025-04-30 RX ADMIN — INSULIN LISPRO 7 UNIT(S): 100 INJECTION, SOLUTION INTRAVENOUS; SUBCUTANEOUS at 02:42

## 2025-04-30 RX ADMIN — INSULIN LISPRO 4: 100 INJECTION, SOLUTION INTRAVENOUS; SUBCUTANEOUS at 12:16

## 2025-04-30 RX ADMIN — INSULIN LISPRO 4: 100 INJECTION, SOLUTION INTRAVENOUS; SUBCUTANEOUS at 08:07

## 2025-04-30 RX ADMIN — CEFEPIME 100 MILLIGRAM(S): 2 INJECTION, POWDER, FOR SOLUTION INTRAVENOUS at 05:01

## 2025-04-30 RX ADMIN — Medication 500 MILLIGRAM(S): at 18:03

## 2025-04-30 RX ADMIN — INSULIN GLARGINE-YFGN 14 UNIT(S): 100 INJECTION, SOLUTION SUBCUTANEOUS at 22:24

## 2025-04-30 RX ADMIN — INSULIN LISPRO 4: 100 INJECTION, SOLUTION INTRAVENOUS; SUBCUTANEOUS at 16:52

## 2025-04-30 RX ADMIN — Medication 81 MILLIGRAM(S): at 11:46

## 2025-04-30 RX ADMIN — ATORVASTATIN CALCIUM 80 MILLIGRAM(S): 80 TABLET, FILM COATED ORAL at 21:37

## 2025-04-30 RX ADMIN — Medication 650 MILLIGRAM(S): at 06:43

## 2025-04-30 RX ADMIN — CEFEPIME 100 MILLIGRAM(S): 2 INJECTION, POWDER, FOR SOLUTION INTRAVENOUS at 18:00

## 2025-04-30 RX ADMIN — ENOXAPARIN SODIUM 40 MILLIGRAM(S): 100 INJECTION SUBCUTANEOUS at 21:37

## 2025-04-30 RX ADMIN — INSULIN LISPRO 7 UNIT(S): 100 INJECTION, SOLUTION INTRAVENOUS; SUBCUTANEOUS at 08:07

## 2025-04-30 RX ADMIN — Medication 100 MILLIGRAM(S): at 13:12

## 2025-04-30 RX ADMIN — LINEZOLID 300 MILLIGRAM(S): 2 INJECTION, SOLUTION INTRAVENOUS at 18:44

## 2025-04-30 NOTE — CONSULT NOTE ADULT - ASSESSMENT
ASSESSMENT:  54yF w/ PMHx of uncontrolled DM, smoking, hx of ETOH use who presented with 3 days of left chest wall/breast pain. Physical exam findings, imaging, and labs as documented above.     PLAN:  -  -  -      Above plan discussed with Attending Surgeon Dr. Hare  , patient, patient family, and Primary team  04-30-25 @ 01:12   ASSESSMENT:  54yF w/ PMHx of uncontrolled DM, smoking, hx of ETOH use who presented with 3 days of left chest wall/breast pain. Physical exam findings, imaging, and labs as documented above.     PLAN:  - No acute surgical intervention. Exam and CT not convincing of necrotizing infection of the chest   - Fever and leukocytosis could be attributed to UA, or URI   - Would recommend continuation of IV abx   - strict glucose control. currently uncontrolled sugars   - trend CBC, CMP    will follow     Above plan discussed with Attending Surgeon Dr. Hare  , patient, patient family, and Primary team  04-30-25 @ 01:12   ASSESSMENT:  54yF w/ PMHx of uncontrolled DM, smoking, hx of ETOH use who presented with 3 days of left chest wall/breast pain. Physical exam findings, imaging, and labs as documented above.     PLAN:  - No acute surgical intervention. Exam and CT not convincing of necrotizing infection of the chest   - get L breast u/s today to definitively rule out pathologic process in L breast  - Would recommend continuation of IV abx   - strict glucose control. currently uncontrolled sugars   - trend CBC, CMP    will follow     Above plan discussed with Attending Surgeon Dr. Hare  , patient, patient family, and Primary team  04-30-25 @ 01:12

## 2025-04-30 NOTE — PROGRESS NOTE ADULT - ASSESSMENT
Patient is a 54-year-old F with PMHx of IDDM (non compliant with home medications last 2 to 3 days), hx of prior spontaneous left breast abscesses (last of which was Dec 2024, s/p I&D at [facility]) who presents for constant, worsening left breast pain ongoing for three days. She denies any fevers at home, breast or nipple discharge, injury to the breast, chest pain, palpitations, nausea, vomiting, diarrhea, new medications. Patient has a productive cough and reports her son whom she lives with has a viral illness. Patient admitted to medicine for management of sepsis, etiology possibly 2/2 infectious myositis and in setting of UTI.       #Left breast pain in setting of hx of prior spontaneous left breast abscesses -- r/o infectious myositis and positive trichomonas   continue with vancomycin, cefepime , flagyl  repeat crp   hiv rpr Mrsa nare  blood cultures pending      #DURGA Creatinine Trend: 0.8<--, 1.3<-- resolved    #Hyponatremia in setting of hyperglycemia  monitor for now     #IDDM  CAPILLARY BLOOD GLUCOSE      POCT Blood Glucose.: 203 mg/dL (30 Apr 2025 11:56)  POCT Blood Glucose.: 221 mg/dL (30 Apr 2025 08:00)  POCT Blood Glucose.: 332 mg/dL (30 Apr 2025 02:40)  POCT Blood Glucose.: 345 mg/dL (30 Apr 2025 01:04)  POCT Blood Glucose.: 449 mg/dL (29 Apr 2025 22:53)  POCT Blood Glucose.: 318 mg/dL (29 Apr 2025 19:21)  monitor , continue with basal bolus for now     #Overweight BMI 27 patient needs to see dieitian outpatient for further evaluation     #Drug monitoring of high risk medication , potential for drug drug reaction , requiring close monitoring  check vanco trough  and monitor creatinine for nephrotoxicitiy     PROGRESS NOTE HANDOFF    Pending:  blood culture full id recommendations     Family discussion: patient verbalized understanding and agreeable to plan of care     Disposition: Home 
Patient is a 54-year-old F with PMHx of IDDM (non compliant with home medications last 2 to 3 days), hx of prior spontaneous left breast abscesses (last of which was Dec 2024, s/p I&D at [facility]) who presents for constant, worsening left breast pain ongoing for three days. She denies any fevers at home, breast or nipple discharge, injury to the breast, chest pain, palpitations, nausea, vomiting, diarrhea, new medications. Patient has a productive cough and reports her son whom she lives with has a viral illness. Patient admitted to medicine for management of sepsis, etiology possibly 2/2 infectious myositis and in setting of UTI.       #Left breast pain in setting of hx of prior spontaneous left breast abscesses -- r/o infectious myositis  - SIRS criteria met on admission - T 102.5, ,  WBC 24k  - CT Chest with IV contrast: Slight relative enlargement of left pectoralis major muscle with mild adjacent fat stranding. Considerations include injury and myositis. No abscess.  - will c/w Vanc, Cefepime, and add Clinda  - ID consult  - surgery consult : no surgical intervention  - oxycodone 5 mg q6h PRN for moderate pain    #UTI  #DURGA  - UA grossly positive with Trichomonas-like cells   - Flagyl 500 mg BID x7 days  - Cr 1.3, baseline unknown -- f/u Cr in AM after IV fluids    #Hyponatremia in setting of hyperglycemia  - Na 126 (corrected for glucose is 129), Glucose 318  - trend BMP, will c/w gentle hydration NS    #IDDM  - patient reports that she takes Metformin 500 mg BID and insulin glargine 14 units qhs  - will start Lispro 7 units TID pre meal, Lantus 14 units qhs, and moderate sliding scale  - f/u HbA1c    MISC  #DVT prophylaxis: Lovenox  #GI prophylaxis: not indicated  #Diet: CC  #Activity: IAT  #Code status: Full Code  #Disposition: Admit to Medicine

## 2025-04-30 NOTE — CONSULT NOTE ADULT - TIME BILLING
I have personally seen and examined this patient.  I have reviewed all pertinent clinical information and reviewed all relevant imaging and diagnostic studies personally.   I counseled the patient about diagnostic testing and treatment plan.   I discussed my recommendations with the primary team Lashawn Ireland

## 2025-04-30 NOTE — CONSULT NOTE ADULT - SUBJECTIVE AND OBJECTIVE BOX
KENDELL DIONY  54y, Female  Allergy: No Known Allergies      CHIEF COMPLAINT:       LOS  1d    HPI  HPI:  Patient is a 54-year-old F with PMHx of IDDM (non compliant with home medications last 2 to 3 days), hx of prior spontaneous left breast abscesses (last of which was Dec 2024, s/p I&D at [facility]) who presents for constant, worsening left breast pain ongoing for three days. She denies any fevers at home, breast or nipple discharge, injury to the breast, chest pain, palpitations, nausea, vomiting, diarrhea, new medications. Patient has a productive cough and reports her son whom she lives with has a viral illness.     Vitals in ED: T 102.5, , /79, RR 20, Sat 95% on RA  Labs in ED: WBC 24k, Na 126 (corrected for glucose is 129), Glucose 318, Cr 1.3 (baseline unknown) UA grossly positive with Trichomonas-like cells; AST/ALT/CPK all WNL  EKG: sinus tachycardia  Imaging:  CT Chest with IV contrast: Slight relative enlargement of left pectoralis major muscle with mild adjacent fat stranding. Considerations include injury and myositis. No abscess.  Interventions: s/p Morphine 4 mg IVPx1, LR 1L bolus followed by 2400 mL bolus; Vanc 1 g x1, Cefepime 2g x1    Patient admitted to medicine for management of sepsis, etiology possibly 2/2 infectious myositis and in setting of UTI.      (29 Apr 2025 20:48)      INFECTIOUS DISEASE HISTORY:  ID consulted for sepsis and L breast infection, febrile in the ER, Admission WBC 24   CT concerning for myositis  Consulted by Surgery - CHEST: left chest wall tenderness. no open wounds, fluctuance, crepitus, warm to touch but no erythema. no breast tissue pain or nipple pain/drainage     Currently ordered for:  cefepime   IVPB 2000 milliGRAM(s) IV Intermittent every 12 hours  clindamycin IVPB 900 milliGRAM(s) IV Intermittent every 8 hours  metroNIDAZOLE    Tablet 500 milliGRAM(s) Oral every 12 hours  vancomycin  IVPB 1250 milliGRAM(s) IV Intermittent every 24 hours      PMH  PAST MEDICAL & SURGICAL HISTORY:      FAMILY HISTORY      SOCIAL HISTORY  Social History:        ROS  ***    VITALS:  T(F): 98, Max: 102.5 (04-29-25 @ 13:23)  HR: 79  BP: 127/69  RR: 18Vital Signs Last 24 Hrs  T(C): 36.7 (30 Apr 2025 08:06), Max: 39.2 (29 Apr 2025 13:23)  T(F): 98 (30 Apr 2025 08:06), Max: 102.5 (29 Apr 2025 13:23)  HR: 79 (30 Apr 2025 08:06) (77 - 139)  BP: 127/69 (30 Apr 2025 08:06) (115/68 - 130/79)  BP(mean): --  RR: 18 (30 Apr 2025 08:06) (18 - 20)  SpO2: 98% (30 Apr 2025 08:06) (95% - 98%)    Parameters below as of 30 Apr 2025 08:06  Patient On (Oxygen Delivery Method): room air        PHYSICAL EXAM:  ***    TESTS & MEASUREMENTS:                        12.1   28.61 )-----------( 263      ( 30 Apr 2025 06:57 )             35.7     04-30    137  |  99  |  12  ----------------------------<  209[H]  3.8   |  22  |  0.8    Ca    8.9      30 Apr 2025 06:57  Mg     1.8     04-30    TPro  7.1  /  Alb  3.3[L]  /  TBili  0.4  /  DBili  x   /  AST  29  /  ALT  37  /  AlkPhos  109  04-30      LIVER FUNCTIONS - ( 30 Apr 2025 06:57 )  Alb: 3.3 g/dL / Pro: 7.1 g/dL / ALK PHOS: 109 U/L / ALT: 37 U/L / AST: 29 U/L / GGT: x           Urinalysis Basic - ( 30 Apr 2025 06:57 )    Color: x / Appearance: x / SG: x / pH: x  Gluc: 209 mg/dL / Ketone: x  / Bili: x / Urobili: x   Blood: x / Protein: x / Nitrite: x   Leuk Esterase: x / RBC: x / WBC x   Sq Epi: x / Non Sq Epi: x / Bacteria: x        Urinalysis with Rflx Culture (collected 04-29-25 @ 17:03)        Lactate, Blood: 1.1 mmol/L (04-30-25 @ 06:57)  Lactate, Blood: 1.8 mmol/L (04-29-25 @ 16:00)      INFECTIOUS DISEASES TESTING      INFLAMMATORY MARKERS  C-Reactive Protein: 530.1 mg/L (04-30-25 @ 06:57)      RADIOLOGY & ADDITIONAL TESTS:  I have personally reviewed the last Chest xray  CXR      CT  CT Chest w/ IV Cont:   ACC: [id number] EXAM:  CT CHEST IC   ORDERED BY: JUDIE VELASQUEZ     PROCEDURE DATE:  04/29/2025          INTERPRETATION:  CLINICAL HISTORY / REASON FOR EXAM: Left chest pain.    TECHNIQUE: Multislice helical sections were obtained from the thoracic   inlet to the lung bases with 95 cc Omnipaque 350 IV contrast   administration. Coronal and sagittal images have been submitted for   evaluation. 3D (MIP) images obtained. 5 cc contrast discarded.    COMPARISON: None.    FINDINGS:    Streak artifact frombody touching the gantry noted.    LUNGS, PLEURA, AIRWAYS: No lobar consolidation, mass, effusion, or   pneumothorax. No evidence of central endobronchial obstruction. No   bronchiectasis or honeycombing. Left subsegmental atelectasis. No   suspicious pulmonary nodules.    THORACIC NODES: No mediastinal, hilar, supraclavicular, or axillary   lymphadenopathy.    MEDIASTINUM/GREAT VESSELS: No pericardial effusion. Heart size is within   normal limits. The aorta and main pulmonary artery are of normal caliber.    BONES/SOFT TISSUES: Degenerative changes spine.. Slight relative   enlargement of left pectoralis major muscle with mild adjacent fat   stranding. No abscess. No subcutaneous gas.    VISUALIZED UPPER ABDOMEN: Punctate nonobstructing left renal calculus.      IMPRESSION:    Slight relative enlargement of left pectoralis major muscle with mild   adjacent fat stranding. Considerations include injury and myositis. No   abscess.    --- End of Report ---            VESTA MARTINEZ MD; Attending Radiologist  This document has been electronically signed. Apr 29 2025  6:48PM (04-29-25 @ 16:57)      CARDIOLOGY TESTING  12 Lead ECG:   Ventricular Rate 142 BPM    Atrial Rate 142 BPM    P-R Interval 120  <TRUNCATED> (04-29-25 @ 13:27)       MEDICATIONS  cefepime   IVPB 2000 IV Intermittent every 12 hours  clindamycin IVPB 900 IV Intermittent every 8 hours  dextrose 5%. 1000 IV Continuous <Continuous>  dextrose 5%. 1000 IV Continuous <Continuous>  dextrose 50% Injectable 25 IV Push once  dextrose 50% Injectable 12.5 IV Push once  dextrose 50% Injectable 25 IV Push once  enoxaparin Injectable 40 SubCutaneous every 24 hours  glucagon  Injectable 1 IntraMuscular once  insulin glargine Injectable (LANTUS) 14 SubCutaneous at bedtime  insulin lispro (ADMELOG) corrective regimen sliding scale  SubCutaneous three times a day before meals  insulin lispro Injectable (ADMELOG) 7 SubCutaneous three times a day before meals  metroNIDAZOLE    Tablet 500 Oral every 12 hours  sodium chloride 0.9%. 1000 IV Continuous <Continuous>  vancomycin  IVPB 1250 IV Intermittent every 24 hours      ANTIBIOTICS:  cefepime   IVPB 2000 milliGRAM(s) IV Intermittent every 12 hours  clindamycin IVPB 900 milliGRAM(s) IV Intermittent every 8 hours  metroNIDAZOLE    Tablet 500 milliGRAM(s) Oral every 12 hours  vancomycin  IVPB 1250 milliGRAM(s) IV Intermittent every 24 hours      ALLERGIES:  No Known Allergies           KENDELL DIONY  54y, Female  Allergy: No Known Allergies      CHIEF COMPLAINT:       LOS  1d    HPI  HPI:  Patient is a 54-year-old F with PMHx of IDDM (non compliant with home medications last 2 to 3 days), hx of prior spontaneous left breast abscesses (last of which was Dec 2024, s/p I&D at [facility]) who presents for constant, worsening left breast pain ongoing for three days. She denies any fevers at home, breast or nipple discharge, injury to the breast, chest pain, palpitations, nausea, vomiting, diarrhea, new medications. Patient has a productive cough and reports her son whom she lives with has a viral illness.     Vitals in ED: T 102.5, , /79, RR 20, Sat 95% on RA  Labs in ED: WBC 24k, Na 126 (corrected for glucose is 129), Glucose 318, Cr 1.3 (baseline unknown) UA grossly positive with Trichomonas-like cells; AST/ALT/CPK all WNL  EKG: sinus tachycardia  Imaging:  CT Chest with IV contrast: Slight relative enlargement of left pectoralis major muscle with mild adjacent fat stranding. Considerations include injury and myositis. No abscess.  Interventions: s/p Morphine 4 mg IVPx1, LR 1L bolus followed by 2400 mL bolus; Vanc 1 g x1, Cefepime 2g x1    Patient admitted to medicine for management of sepsis, etiology possibly 2/2 infectious myositis and in setting of UTI.      (29 Apr 2025 20:48)      INFECTIOUS DISEASE HISTORY:  ID consulted for sepsis and L breast infection, febrile in the ER, Admission WBC 24   CT concerning for myositis  Consulted by Surgery - CHEST: left chest wall tenderness. no open wounds, fluctuance, crepitus, warm to touch but no erythema. no breast tissue pain or nipple pain/drainage     Pt reports on Monday she developed pain in the L upper chest. No fever at home.   Her son has a URI. She reports some sore throat that has improved. No cough, no dysuria, arthralgias. No travel. Cat at home.   Reports new sexual partner about 1 mo ago.   UA concerning for trich-like cells     Currently ordered for:  cefepime   IVPB 2000 milliGRAM(s) IV Intermittent every 12 hours  clindamycin IVPB 900 milliGRAM(s) IV Intermittent every 8 hours  metroNIDAZOLE    Tablet 500 milliGRAM(s) Oral every 12 hours  vancomycin  IVPB 1250 milliGRAM(s) IV Intermittent every 24 hours      PMH  PAST MEDICAL & SURGICAL HISTORY:      FAMILY HISTORY      SOCIAL HISTORY  Social History:        ROS  General: as noted above   HEENT: Denies headache, rhinorrhea, sore throat, eye pain  CV: Denies CP, palpitations  PULM: Denies wheezing, hemoptysis  GI: Denies hematemesis, hematochezia, melena  : Denies discharge, hematuria  MSK: as noted above   SKIN: as noted above   NEURO: Denies paresthesias, weakness  PSYCH: Denies depression, anxiety     VITALS:  T(F): 98, Max: 102.5 (04-29-25 @ 13:23)  HR: 79  BP: 127/69  RR: 18Vital Signs Last 24 Hrs  T(C): 36.7 (30 Apr 2025 08:06), Max: 39.2 (29 Apr 2025 13:23)  T(F): 98 (30 Apr 2025 08:06), Max: 102.5 (29 Apr 2025 13:23)  HR: 79 (30 Apr 2025 08:06) (77 - 139)  BP: 127/69 (30 Apr 2025 08:06) (115/68 - 130/79)  BP(mean): --  RR: 18 (30 Apr 2025 08:06) (18 - 20)  SpO2: 98% (30 Apr 2025 08:06) (95% - 98%)    Parameters below as of 30 Apr 2025 08:06  Patient On (Oxygen Delivery Method): room air        PHYSICAL EXAM:  Gen: NAD, resting in bed  HEENT: Normocephalic, atraumatic, difficult to visualize tonsils  Neck: supple, no lymphadenopathy  CV: Regular rate & regular rhythm  Severe left upper chest tenderness to palpation , no fluctuance   Lungs: decreased BS at bases, no fremitus  Abdomen: Soft, BS present  Ext: Warm, well perfused  Neuro: non focal, awake  Skin: no rash, no erythema  Lines: no phlebitis     TESTS & MEASUREMENTS:                        12.1   28.61 )-----------( 263      ( 30 Apr 2025 06:57 )             35.7     04-30    137  |  99  |  12  ----------------------------<  209[H]  3.8   |  22  |  0.8    Ca    8.9      30 Apr 2025 06:57  Mg     1.8     04-30    TPro  7.1  /  Alb  3.3[L]  /  TBili  0.4  /  DBili  x   /  AST  29  /  ALT  37  /  AlkPhos  109  04-30      LIVER FUNCTIONS - ( 30 Apr 2025 06:57 )  Alb: 3.3 g/dL / Pro: 7.1 g/dL / ALK PHOS: 109 U/L / ALT: 37 U/L / AST: 29 U/L / GGT: x           Urinalysis Basic - ( 30 Apr 2025 06:57 )    Color: x / Appearance: x / SG: x / pH: x  Gluc: 209 mg/dL / Ketone: x  / Bili: x / Urobili: x   Blood: x / Protein: x / Nitrite: x   Leuk Esterase: x / RBC: x / WBC x   Sq Epi: x / Non Sq Epi: x / Bacteria: x        Urinalysis with Rflx Culture (collected 04-29-25 @ 17:03)        Lactate, Blood: 1.1 mmol/L (04-30-25 @ 06:57)  Lactate, Blood: 1.8 mmol/L (04-29-25 @ 16:00)      INFECTIOUS DISEASES TESTING      INFLAMMATORY MARKERS  C-Reactive Protein: 530.1 mg/L (04-30-25 @ 06:57)      RADIOLOGY & ADDITIONAL TESTS:  I have personally reviewed the last Chest xray  CXR      CT  CT Chest w/ IV Cont:   ACC: [id number] EXAM:  CT CHEST IC   ORDERED BY: JUDIE VELASQUEZ     PROCEDURE DATE:  04/29/2025          INTERPRETATION:  CLINICAL HISTORY / REASON FOR EXAM: Left chest pain.    TECHNIQUE: Multislice helical sections were obtained from the thoracic   inlet to the lung bases with 95 cc Omnipaque 350 IV contrast   administration. Coronal and sagittal images have been submitted for   evaluation. 3D (MIP) images obtained. 5 cc contrast discarded.    COMPARISON: None.    FINDINGS:    Streak artifact frombody touching the gantry noted.    LUNGS, PLEURA, AIRWAYS: No lobar consolidation, mass, effusion, or   pneumothorax. No evidence of central endobronchial obstruction. No   bronchiectasis or honeycombing. Left subsegmental atelectasis. No   suspicious pulmonary nodules.    THORACIC NODES: No mediastinal, hilar, supraclavicular, or axillary   lymphadenopathy.    MEDIASTINUM/GREAT VESSELS: No pericardial effusion. Heart size is within   normal limits. The aorta and main pulmonary artery are of normal caliber.    BONES/SOFT TISSUES: Degenerative changes spine.. Slight relative   enlargement of left pectoralis major muscle with mild adjacent fat   stranding. No abscess. No subcutaneous gas.    VISUALIZED UPPER ABDOMEN: Punctate nonobstructing left renal calculus.      IMPRESSION:    Slight relative enlargement of left pectoralis major muscle with mild   adjacent fat stranding. Considerations include injury and myositis. No   abscess.    --- End of Report ---            VESTA MARTINEZ MD; Attending Radiologist  This document has been electronically signed. Apr 29 2025  6:48PM (04-29-25 @ 16:57)      CARDIOLOGY TESTING  12 Lead ECG:   Ventricular Rate 142 BPM    Atrial Rate 142 BPM    P-R Interval 120  <TRUNCATED> (04-29-25 @ 13:27)       MEDICATIONS  cefepime   IVPB 2000 IV Intermittent every 12 hours  clindamycin IVPB 900 IV Intermittent every 8 hours  dextrose 5%. 1000 IV Continuous <Continuous>  dextrose 5%. 1000 IV Continuous <Continuous>  dextrose 50% Injectable 25 IV Push once  dextrose 50% Injectable 12.5 IV Push once  dextrose 50% Injectable 25 IV Push once  enoxaparin Injectable 40 SubCutaneous every 24 hours  glucagon  Injectable 1 IntraMuscular once  insulin glargine Injectable (LANTUS) 14 SubCutaneous at bedtime  insulin lispro (ADMELOG) corrective regimen sliding scale  SubCutaneous three times a day before meals  insulin lispro Injectable (ADMELOG) 7 SubCutaneous three times a day before meals  metroNIDAZOLE    Tablet 500 Oral every 12 hours  sodium chloride 0.9%. 1000 IV Continuous <Continuous>  vancomycin  IVPB 1250 IV Intermittent every 24 hours      ANTIBIOTICS:  cefepime   IVPB 2000 milliGRAM(s) IV Intermittent every 12 hours  clindamycin IVPB 900 milliGRAM(s) IV Intermittent every 8 hours  metroNIDAZOLE    Tablet 500 milliGRAM(s) Oral every 12 hours  vancomycin  IVPB 1250 milliGRAM(s) IV Intermittent every 24 hours      ALLERGIES:  No Known Allergies           DIONY RDZ  54y, Female  Allergy: No Known Allergies      CHIEF COMPLAINT:       LOS  1d    HPI  HPI:  Patient is a 54-year-old F with PMHx of IDDM (non compliant with home medications last 2 to 3 days), hx of prior spontaneous left breast abscesses (last of which was Dec 2024, s/p I&D at [facility]) who presents for constant, worsening left breast pain ongoing for three days. She denies any fevers at home, breast or nipple discharge, injury to the breast, chest pain, palpitations, nausea, vomiting, diarrhea, new medications. Patient has a productive cough and reports her son whom she lives with has a viral illness.     Vitals in ED: T 102.5, , /79, RR 20, Sat 95% on RA  Labs in ED: WBC 24k, Na 126 (corrected for glucose is 129), Glucose 318, Cr 1.3 (baseline unknown) UA grossly positive with Trichomonas-like cells; AST/ALT/CPK all WNL  EKG: sinus tachycardia  Imaging:  CT Chest with IV contrast: Slight relative enlargement of left pectoralis major muscle with mild adjacent fat stranding. Considerations include injury and myositis. No abscess.  Interventions: s/p Morphine 4 mg IVPx1, LR 1L bolus followed by 2400 mL bolus; Vanc 1 g x1, Cefepime 2g x1    Patient admitted to medicine for management of sepsis, etiology possibly 2/2 infectious myositis and in setting of UTI.      (29 Apr 2025 20:48)      INFECTIOUS DISEASE HISTORY:  ID consulted for sepsis and L breast infection, febrile in the ER, Admission WBC 24   CT concerning for myositis  Consulted by Surgery - CHEST: left chest wall tenderness. no open wounds, fluctuance, crepitus, warm to touch but no erythema. no breast tissue pain or nipple pain/drainage     Pt reports on Monday she developed pain in the L upper chest. No fever at home.   Her son has a URI. She reports some sore throat that has improved. No cough, no dysuria, arthralgias. No travel. Cat at home.   Reports new sexual partner about 1 mo ago.   Denies any recent cocaine use, occasional use in the past  UA concerning for trich-like cells     Currently ordered for:  cefepime   IVPB 2000 milliGRAM(s) IV Intermittent every 12 hours  clindamycin IVPB 900 milliGRAM(s) IV Intermittent every 8 hours  metroNIDAZOLE    Tablet 500 milliGRAM(s) Oral every 12 hours  vancomycin  IVPB 1250 milliGRAM(s) IV Intermittent every 24 hours      PMH  PAST MEDICAL & SURGICAL HISTORY:      FAMILY HISTORY      SOCIAL HISTORY  Social History:        ROS  General: as noted above   HEENT: Denies headache, rhinorrhea, sore throat, eye pain  CV: Denies CP, palpitations  PULM: Denies wheezing, hemoptysis  GI: Denies hematemesis, hematochezia, melena  : Denies discharge, hematuria  MSK: as noted above   SKIN: as noted above   NEURO: Denies paresthesias, weakness  PSYCH: Denies depression, anxiety     VITALS:  T(F): 98, Max: 102.5 (04-29-25 @ 13:23)  HR: 79  BP: 127/69  RR: 18Vital Signs Last 24 Hrs  T(C): 36.7 (30 Apr 2025 08:06), Max: 39.2 (29 Apr 2025 13:23)  T(F): 98 (30 Apr 2025 08:06), Max: 102.5 (29 Apr 2025 13:23)  HR: 79 (30 Apr 2025 08:06) (77 - 139)  BP: 127/69 (30 Apr 2025 08:06) (115/68 - 130/79)  BP(mean): --  RR: 18 (30 Apr 2025 08:06) (18 - 20)  SpO2: 98% (30 Apr 2025 08:06) (95% - 98%)    Parameters below as of 30 Apr 2025 08:06  Patient On (Oxygen Delivery Method): room air        PHYSICAL EXAM:  Gen: NAD, resting in bed  HEENT: Normocephalic, atraumatic, difficult to visualize tonsils  Neck: supple, no lymphadenopathy  CV: Regular rate & regular rhythm  Severe left upper chest tenderness to palpation , no fluctuance   Lungs: decreased BS at bases, no fremitus  Abdomen: Soft, BS present  Ext: Warm, well perfused  Neuro: non focal, awake  Skin: no rash, no erythema  Lines: no phlebitis     TESTS & MEASUREMENTS:                        12.1   28.61 )-----------( 263      ( 30 Apr 2025 06:57 )             35.7     04-30    137  |  99  |  12  ----------------------------<  209[H]  3.8   |  22  |  0.8    Ca    8.9      30 Apr 2025 06:57  Mg     1.8     04-30    TPro  7.1  /  Alb  3.3[L]  /  TBili  0.4  /  DBili  x   /  AST  29  /  ALT  37  /  AlkPhos  109  04-30      LIVER FUNCTIONS - ( 30 Apr 2025 06:57 )  Alb: 3.3 g/dL / Pro: 7.1 g/dL / ALK PHOS: 109 U/L / ALT: 37 U/L / AST: 29 U/L / GGT: x           Urinalysis Basic - ( 30 Apr 2025 06:57 )    Color: x / Appearance: x / SG: x / pH: x  Gluc: 209 mg/dL / Ketone: x  / Bili: x / Urobili: x   Blood: x / Protein: x / Nitrite: x   Leuk Esterase: x / RBC: x / WBC x   Sq Epi: x / Non Sq Epi: x / Bacteria: x        Urinalysis with Rflx Culture (collected 04-29-25 @ 17:03)        Lactate, Blood: 1.1 mmol/L (04-30-25 @ 06:57)  Lactate, Blood: 1.8 mmol/L (04-29-25 @ 16:00)      INFECTIOUS DISEASES TESTING      INFLAMMATORY MARKERS  C-Reactive Protein: 530.1 mg/L (04-30-25 @ 06:57)      RADIOLOGY & ADDITIONAL TESTS:  I have personally reviewed the last Chest xray  CXR      CT  CT Chest w/ IV Cont:   ACC: [id number] EXAM:  CT CHEST IC   ORDERED BY: JUDIE VELASQUEZ     PROCEDURE DATE:  04/29/2025          INTERPRETATION:  CLINICAL HISTORY / REASON FOR EXAM: Left chest pain.    TECHNIQUE: Multislice helical sections were obtained from the thoracic   inlet to the lung bases with 95 cc Omnipaque 350 IV contrast   administration. Coronal and sagittal images have been submitted for   evaluation. 3D (MIP) images obtained. 5 cc contrast discarded.    COMPARISON: None.    FINDINGS:    Streak artifact frombody touching the gantry noted.    LUNGS, PLEURA, AIRWAYS: No lobar consolidation, mass, effusion, or   pneumothorax. No evidence of central endobronchial obstruction. No   bronchiectasis or honeycombing. Left subsegmental atelectasis. No   suspicious pulmonary nodules.    THORACIC NODES: No mediastinal, hilar, supraclavicular, or axillary   lymphadenopathy.    MEDIASTINUM/GREAT VESSELS: No pericardial effusion. Heart size is within   normal limits. The aorta and main pulmonary artery are of normal caliber.    BONES/SOFT TISSUES: Degenerative changes spine.. Slight relative   enlargement of left pectoralis major muscle with mild adjacent fat   stranding. No abscess. No subcutaneous gas.    VISUALIZED UPPER ABDOMEN: Punctate nonobstructing left renal calculus.      IMPRESSION:    Slight relative enlargement of left pectoralis major muscle with mild   adjacent fat stranding. Considerations include injury and myositis. No   abscess.    --- End of Report ---            VESTA MARTINEZ MD; Attending Radiologist  This document has been electronically signed. Apr 29 2025  6:48PM (04-29-25 @ 16:57)      CARDIOLOGY TESTING  12 Lead ECG:   Ventricular Rate 142 BPM    Atrial Rate 142 BPM    P-R Interval 120  <TRUNCATED> (04-29-25 @ 13:27)       MEDICATIONS  cefepime   IVPB 2000 IV Intermittent every 12 hours  clindamycin IVPB 900 IV Intermittent every 8 hours  dextrose 5%. 1000 IV Continuous <Continuous>  dextrose 5%. 1000 IV Continuous <Continuous>  dextrose 50% Injectable 25 IV Push once  dextrose 50% Injectable 12.5 IV Push once  dextrose 50% Injectable 25 IV Push once  enoxaparin Injectable 40 SubCutaneous every 24 hours  glucagon  Injectable 1 IntraMuscular once  insulin glargine Injectable (LANTUS) 14 SubCutaneous at bedtime  insulin lispro (ADMELOG) corrective regimen sliding scale  SubCutaneous three times a day before meals  insulin lispro Injectable (ADMELOG) 7 SubCutaneous three times a day before meals  metroNIDAZOLE    Tablet 500 Oral every 12 hours  sodium chloride 0.9%. 1000 IV Continuous <Continuous>  vancomycin  IVPB 1250 IV Intermittent every 24 hours      ANTIBIOTICS:  cefepime   IVPB 2000 milliGRAM(s) IV Intermittent every 12 hours  clindamycin IVPB 900 milliGRAM(s) IV Intermittent every 8 hours  metroNIDAZOLE    Tablet 500 milliGRAM(s) Oral every 12 hours  vancomycin  IVPB 1250 milliGRAM(s) IV Intermittent every 24 hours      ALLERGIES:  No Known Allergies

## 2025-04-30 NOTE — PROGRESS NOTE ADULT - SUBJECTIVE AND OBJECTIVE BOX
SUBJECTIVE/OVERNIGHT EVENTS  Today is hospital day 1d. This morning patient was seen and examined at bedside, resting comfortably in bed. No acute or major events overnight.        MEDICATIONS  STANDING MEDICATIONS  cefepime   IVPB 2000 milliGRAM(s) IV Intermittent every 12 hours  clindamycin IVPB 900 milliGRAM(s) IV Intermittent every 8 hours  dextrose 5%. 1000 milliLiter(s) IV Continuous <Continuous>  dextrose 5%. 1000 milliLiter(s) IV Continuous <Continuous>  dextrose 50% Injectable 25 Gram(s) IV Push once  dextrose 50% Injectable 12.5 Gram(s) IV Push once  dextrose 50% Injectable 25 Gram(s) IV Push once  enoxaparin Injectable 40 milliGRAM(s) SubCutaneous every 24 hours  glucagon  Injectable 1 milliGRAM(s) IntraMuscular once  insulin glargine Injectable (LANTUS) 14 Unit(s) SubCutaneous at bedtime  insulin lispro (ADMELOG) corrective regimen sliding scale   SubCutaneous three times a day before meals  insulin lispro Injectable (ADMELOG) 7 Unit(s) SubCutaneous three times a day before meals  metroNIDAZOLE    Tablet 500 milliGRAM(s) Oral every 12 hours  sodium chloride 0.9%. 1000 milliLiter(s) IV Continuous <Continuous>  vancomycin  IVPB 1250 milliGRAM(s) IV Intermittent every 24 hours    PRN MEDICATIONS  acetaminophen     Tablet .. 650 milliGRAM(s) Oral every 6 hours PRN  dextrose Oral Gel 15 Gram(s) Oral once PRN  oxycodone    5 mG/acetaminophen 325 mG 1 Tablet(s) Oral every 6 hours PRN    VITALS  T(F): 98 (04-30-25 @ 08:06), Max: 102.5 (04-29-25 @ 13:23)  HR: 79 (04-30-25 @ 08:06) (77 - 139)  BP: 127/69 (04-30-25 @ 08:06) (115/68 - 130/79)  RR: 18 (04-30-25 @ 08:06) (18 - 20)  SpO2: 98% (04-30-25 @ 08:06) (95% - 98%)  POCT Blood Glucose.: 221 mg/dL (04-30-25 @ 08:00)  POCT Blood Glucose.: 332 mg/dL (04-30-25 @ 02:40)  POCT Blood Glucose.: 345 mg/dL (04-30-25 @ 01:04)  POCT Blood Glucose.: 449 mg/dL (04-29-25 @ 22:53)  POCT Blood Glucose.: 318 mg/dL (04-29-25 @ 19:21)      PHYSICAL EXAM:  GENERAL: NAD, well-groomed, well-developed  HEAD:  Atraumatic, Normocephalic  EYES: EOMI   ENMT:  Moist mucous membranes  NECK: Supple   CHEST/LUNG: Clear, breast shows no abscess, but tenderness to touch pectoralis muscle L sided  HEART: Regular rate and rhythm  ABDOMEN: Soft, Nontender  NEURO:  MENTAL STATUS: AAOx3  EXTREMITIES: No LE edema, no calf tenderness  LYMPH: No lymphadenopathy noted  SKIN: No rashes or lesions         LABS             12.1   28.61 )-----------( 263      ( 04-30-25 @ 06:57 )             35.7     137  |  99  |  12  -------------------------<  209   04-30-25 @ 06:57  3.8  |  22  |  0.8    Ca      8.9     04-30-25 @ 06:57  Mg     1.8     04-30-25 @ 06:57    TPro  7.1  /  Alb  3.3  /  TBili  0.4  /  DBili  x   /  AST  29  /  ALT  37  /  AlkPhos  109  /  GGT  x     04-30-25 @ 06:57    PT/INR - ( 04-29-25 @ 16:00 )   PT: 13.20 sec[H];   INR: 1.12 ratio  PTT - ( 04-29-25 @ 16:00 )  PTT:37.0 sec      Urinalysis Basic - ( 30 Apr 2025 06:57 )    Color: x / Appearance: x / SG: x / pH: x  Gluc: 209 mg/dL / Ketone: x  / Bili: x / Urobili: x   Blood: x / Protein: x / Nitrite: x   Leuk Esterase: x / RBC: x / WBC x   Sq Epi: x / Non Sq Epi: x / Bacteria: x          Urinalysis with Rflx Culture (collected 29 Apr 2025 17:03)      IMAGING      ASSESSMENT AND PLAN:            
  DIONY RDZ  54y  [facility] 4B 0[address] A      Patient is a 54y old  Female who presents with a chief complaint of     My note supersedes the resident's note      INTERVAL HPI/OVERNIGHT EVENTS:  no acute events overnight       REVIEW OF SYSTEMS:  CONSTITUTIONAL: No fever, weight loss, or fatigue  EYES: No eye pain, visual disturbances, or discharge  ENMT:  No difficulty hearing, tinnitus, vertigo; No sinus or throat pain  NECK: No pain or stiffness  BREASTS: No pain, masses, or nipple discharge  RESPIRATORY: No cough, wheezing, chills or hemoptysis; No shortness of breath  CARDIOVASCULAR: No chest pain, palpitations, dizziness, or leg swelling  GASTROINTESTINAL: No abdominal or epigastric pain. No nausea, vomiting, or hematemesis; No diarrhea or constipation. No melena or hematochezia.  GENITOURINARY: No dysuria, frequency, hematuria, or incontinence  NEUROLOGICAL: No headaches, memory loss, loss of strength, numbness, or tremors  SKIN: No itching, burning, rashes, or lesions   LYMPH NODES: No enlarged glands  ENDOCRINE: No heat or cold intolerance; No hair loss  MUSCULOSKELETAL: No joint pain or swelling; No muscle, back, or extremity pain  PSYCHIATRIC: No depression, anxiety, mood swings, or difficulty sleeping  HEME/LYMPH: No easy bruising, or bleeding gums  ALLERY AND IMMUNOLOGIC: No hives or eczema  FAMILY HISTORY:    T(C): 36.7 (04-30-25 @ 08:06), Max: 38.2 (04-29-25 @ 16:45)  HR: 79 (04-30-25 @ 08:06) (77 - 130)  BP: 127/69 (04-30-25 @ 08:06) (115/68 - 127/69)  RR: 18 (04-30-25 @ 08:06) (18 - 18)  SpO2: 98% (04-30-25 @ 08:06) (95% - 98%)  Wt(kg): --Vital Signs Last 24 Hrs  T(C): 36.7 (30 Apr 2025 08:06), Max: 38.2 (29 Apr 2025 16:45)  T(F): 98 (30 Apr 2025 08:06), Max: 100.8 (29 Apr 2025 16:45)  HR: 79 (30 Apr 2025 08:06) (77 - 130)  BP: 127/69 (30 Apr 2025 08:06) (115/68 - 127/69)  BP(mean): --  RR: 18 (30 Apr 2025 08:06) (18 - 18)  SpO2: 98% (30 Apr 2025 08:06) (95% - 98%)    Parameters below as of 30 Apr 2025 08:06  Patient On (Oxygen Delivery Method): room air        PHYSICAL EXAM:  GENERAL: NAD,   HEAD:  Atraumatic, Normocephalic  EYES: EOMI, PERRLA, conjunctiva and sclera clear  ENMT: No tonsillar erythema, exudates, or enlargement; Moist mucous membranes, Good dentition, No lesions  NECK: Supple, No JVD, Normal thyroid  NERVOUS SYSTEM:  Alert & Oriented X3, Good concentration; Motor Strength 5/5 B/L upper and lower extremities; DTRs 2+ intact and symmetric  PULM: Clear to auscultation bilaterally  CARDIAC: Regular rate and rhythm; No murmurs, rubs, or gallops  GI: Soft, Nontender, Nondistended; Bowel sounds present  EXTREMITIES:  2+ Peripheral Pulses, No clubbing, cyanosis, or edema  LYMPH: No lymphadenopathy noted  SKIN: tender at chest     Consultant(s) Notes Reviewed:  [x ] YES  [ ] NO  Care Discussed with Consultants/Other Providers [ x] YES  [ ] NO    LABS:                            12.1   28.61 )-----------( 263      ( 30 Apr 2025 06:57 )             35.7   04-30    137  |  99  |  12  ----------------------------<  209[H]  3.8   |  22  |  0.8    Ca    8.9      30 Apr 2025 06:57  Mg     1.8     04-30    TPro  7.1  /  Alb  3.3[L]  /  TBili  0.4  /  DBili  x   /  AST  29  /  ALT  37  /  AlkPhos  109  04-30            Urinalysis with Rflx Culture (collected 29 Apr 2025 17:03)      acetaminophen     Tablet .. 650 milliGRAM(s) Oral every 6 hours PRN  aspirin  chewable 81 milliGRAM(s) Oral daily  atorvastatin 80 milliGRAM(s) Oral at bedtime  cefepime   IVPB 2000 milliGRAM(s) IV Intermittent every 12 hours  chlorhexidine 2% Cloths 1 Application(s) Topical <User Schedule>  clindamycin IVPB 900 milliGRAM(s) IV Intermittent every 8 hours  dextrose 5%. 1000 milliLiter(s) IV Continuous <Continuous>  dextrose 5%. 1000 milliLiter(s) IV Continuous <Continuous>  dextrose 50% Injectable 25 Gram(s) IV Push once  dextrose 50% Injectable 12.5 Gram(s) IV Push once  dextrose 50% Injectable 25 Gram(s) IV Push once  dextrose Oral Gel 15 Gram(s) Oral once PRN  enoxaparin Injectable 40 milliGRAM(s) SubCutaneous every 24 hours  glucagon  Injectable 1 milliGRAM(s) IntraMuscular once  insulin glargine Injectable (LANTUS) 14 Unit(s) SubCutaneous at bedtime  insulin lispro (ADMELOG) corrective regimen sliding scale   SubCutaneous three times a day before meals  insulin lispro Injectable (ADMELOG) 7 Unit(s) SubCutaneous three times a day before meals  metroNIDAZOLE    Tablet 500 milliGRAM(s) Oral every 12 hours  oxycodone    5 mG/acetaminophen 325 mG 1 Tablet(s) Oral every 6 hours PRN  sodium chloride 0.9%. 1000 milliLiter(s) IV Continuous <Continuous>  vancomycin  IVPB 1250 milliGRAM(s) IV Intermittent every 24 hours      HEALTH ISSUES - PROBLEM Dx:          Case Discussed with House Staff   Spectra [phone]

## 2025-04-30 NOTE — CHART NOTE - NSCHARTNOTEFT_GEN_A_CORE
Left breast ultrasound negative. Surgery team to sign off. Would recommend continuation of IV abx, strict glucose control. currently uncontrolled sugars, trend CBC, CMP. Recall as needed. Left breast ultrasound negative. Surgery team to sign off. Would recommend continuation of IV abx, strict glucose control. currently uncontrolled sugars, trend CBC, CMP. Recall as needed. Can follow-up with breast team outpatient.

## 2025-04-30 NOTE — CONSULT NOTE ADULT - ASSESSMENT
ASSESSMENT  54-year-old F with PMHx of IDDM (non compliant with home medications last 2 to 3 days), hx of prior spontaneous left breast abscesses (last of which was Dec 2024, s/p I&D at [facility]) who presents for constant, worsening left breast pain ongoing for three days.    IMPRESSION  #  #Sepsis on admission T102.5 P>90 Admission WBC 24    CT Chest with IV contrast: Slight relative enlargement of left pectoralis major muscle with mild adjacent fat stranding. Considerations include injury and myositis. No abscess.    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 All final recommendations to follow after interview and examination of the patient. Please follow recommendations noted below.  - Appreciate Surgery consult- Exam and CT not convincing of necrotizing infection of the chest    If any questions, please send a message or call on [employer] Teams  Please continue to update ID with any pertinent new laboratory, radiographic findings, or change in clinical status ASSESSMENT  54-year-old F with PMHx of IDDM (non compliant with home medications last 2 to 3 days), hx of prior spontaneous left breast abscesses (last of which was Dec 2024, s/p I&D at [facility]) who presents for constant, worsening left breast pain ongoing for three days.    IMPRESSION  #Sepsis on admission T102.5 P>90 Admission WBC 24  Rule out bacteremia, rule out infectious myositis     CT Chest with IV contrast: Slight relative enlargement of left pectoralis major muscle with mild adjacent fat stranding. Considerations include injury and myositis. No abscess.    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  which could result in poor clinical outcomes  #Abx allergy: No Known Allergies    Creatinine: 0.8 (04-30-25 @ 06:57)    Height (cm): 167.6 (04-29-25 @ 23:07)  Weight (kg): 77.1 (04-29-25 @ 13:23)    RECOMMENDATIONS  - f/u BCX  - MRSA nares  - Trend WBC/CRP  - D/C Vanc, Clinda, Cefepime  - IV linezolid 600mg q12h   - PO flagyl 500mg BID x 7 days for trichomonas  - STI screening: HIV Ab/Ag CMIA, G/C Naat Urine, Syphilis screen  - Appreciate Surgery consult- Exam and CT not convincing of necrotizing infection of the chest, no intervention    If any questions, please send a message or call on [employer] Teams  Please continue to update ID with any pertinent new laboratory, radiographic findings, or change in clinical status

## 2025-04-30 NOTE — CONSULT NOTE ADULT - SUBJECTIVE AND OBJECTIVE BOX
GENERAL SURGERY CONSULT NOTE    Patient: DIONY RDZ , 54y (66)Female   MRN: [id number]  Location: [place] [address] A  Visit: 25 Inpatient  Date: 25 @ 01:12    HPI:  Patient is a 54-year-old F with PMHx of DM (non compliant with home medications) hx of prior spontaneous left breast abscesses (last of which was Dec 2024, s/p I&D at [facility]) who presents for constant, worsening left chest wall pain ongoing for three days. She denies any fevers at home, breast or nipple discharge, injury to the breast, chest pain, palpitations, nausea, vomiting, diarrhea, new medications. Patient has a productive cough and reports her son whom she lives with has a viral illness.   Tachycardic and febrile on arrival. Uncontrolled sugars to the 400s. Started on broad spectrum abx   UA + with trichomonas cells. WBC 24 on arrival.      CT Chest with IV contrast: Slight relative enlargement of left pectoralis major muscle with mild adjacent fat stranding. Considerations include injury and myositis. No abscess.  Interventions: s/p Morphine 4 mg IVPx1, LR 1L bolus followed by 2400 mL bolus; Vanc 1 g x1, Cefepime 2g x1    Patient admitted to medicine for management of sepsis, etiology possibly 2/2 infectious myositis and in setting of UTI.   Surgery consulted to rule out a NSTI of the left chest       PAST MEDICAL & SURGICAL HISTORY:      Home Medications:  aspirin 81 mg oral tablet: 1 tab(s) orally once a day (2025 22:40)  insulin glargine 100 units/mL subcutaneous solution: 14 unit(s) subcutaneous once a day (at bedtime) (2025 22:39)  metFORMIN 500 mg oral tablet: 1 tab(s) orally 2 times a day (2025 22:39)        VITALS:  T(F): 98.1 (25 @ 23:07), Max: 102.5 (25 @ 13:23)  HR: 77 (25 @ 23:07) (77 - 139)  BP: 121/66 (25 @ 23:07) (115/68 - 130/79)  RR: 18 (25 @ 16:45) (18 - 20)  SpO2: 95% (04-29-25 @ 16:45) (95% - 95%)    PHYSICAL EXAM:  General: NAD, AAOx3, calm and cooperative  HEENT: NCAT, DANK, EOMI, Trachea ML, Neck supple  Cardiac: RRR S1, S2, no Murmurs, rubs or gallops  CHEST: left chest wall tenderness. no open wounds, fluctuance, crepitus, warm to touch but no erythema. no breast tissue pain or nipple pain/drainage   Respiratory: CTAB, normal respiratory effort  Abdomen: Soft, non-distended, non-tender      MEDICATIONS  (STANDING):  cefepime   IVPB 2000 milliGRAM(s) IV Intermittent every 12 hours  clindamycin IVPB 900 milliGRAM(s) IV Intermittent every 8 hours  dextrose 5%. 1000 milliLiter(s) (50 mL/Hr) IV Continuous <Continuous>  dextrose 5%. 1000 milliLiter(s) (100 mL/Hr) IV Continuous <Continuous>  dextrose 50% Injectable 25 Gram(s) IV Push once  dextrose 50% Injectable 12.5 Gram(s) IV Push once  dextrose 50% Injectable 25 Gram(s) IV Push once  enoxaparin Injectable 40 milliGRAM(s) SubCutaneous every 24 hours  glucagon  Injectable 1 milliGRAM(s) IntraMuscular once  insulin glargine Injectable (LANTUS) 14 Unit(s) SubCutaneous at bedtime  insulin lispro (ADMELOG) corrective regimen sliding scale   SubCutaneous three times a day before meals  insulin lispro Injectable (ADMELOG) 7 Unit(s) SubCutaneous three times a day before meals  metroNIDAZOLE    Tablet 500 milliGRAM(s) Oral every 12 hours  sodium chloride 0.9%. 1000 milliLiter(s) (75 mL/Hr) IV Continuous <Continuous>  vancomycin  IVPB 1250 milliGRAM(s) IV Intermittent every 24 hours    MEDICATIONS  (PRN):  acetaminophen     Tablet .. 650 milliGRAM(s) Oral every 6 hours PRN Mild Pain (1 - 3)  dextrose Oral Gel 15 Gram(s) Oral once PRN Blood Glucose LESS THAN 70 milliGRAM(s)/deciliter  oxycodone    5 mG/acetaminophen 325 mG 1 Tablet(s) Oral every 6 hours PRN Moderate Pain (4 - 6)      LAB/STUDIES:                        13.5   24.33 )-----------( 270      ( 2025 15:47 )             39.7         126[L]  |  88[L]  |  15  ----------------------------<  318[H]  4.0   |  23  |  1.3    Ca    8.8      2025 15:47    TPro  8.3[H]  /  Alb  3.9  /  TBili  0.4  /  DBili  x   /  AST  33  /  ALT  37  /  AlkPhos  111      PT/INR - ( 2025 16:00 )   PT: 13.20 sec;   INR: 1.12 ratio         PTT - ( 2025 16:00 )  PTT:37.0 sec  LIVER FUNCTIONS - ( 2025 15:47 )  Alb: 3.9 g/dL / Pro: 8.3 g/dL / ALK PHOS: 111 U/L / ALT: 37 U/L / AST: 33 U/L / GGT: x           Urinalysis Basic - ( 2025 17:03 )    Color: Yellow / Appearance: Cloudy / S.017 / pH: x  Gluc: x / Ketone: 15 mg/dL  / Bili: Negative / Urobili: 1.0 mg/dL   Blood: x / Protein: 100 mg/dL / Nitrite: Negative   Leuk Esterase: Large / RBC: 8 /HPF /  /HPF   Sq Epi: x / Non Sq Epi: 5 /HPF / Bacteria: Few /HPF      Urinalysis with Rflx Culture (collected 2025 17:03)      IMAGING:  < from: CT Chest w/ IV Cont (25 @ 16:57) >  Slight relative enlargement of left pectoralis major muscle with mild   adjacent fat stranding. Considerations include injury and myositis. No   abscess.

## 2025-05-01 ENCOUNTER — TRANSCRIPTION ENCOUNTER (OUTPATIENT)
Age: 55
End: 2025-05-01

## 2025-05-01 VITALS
SYSTOLIC BLOOD PRESSURE: 127 MMHG | DIASTOLIC BLOOD PRESSURE: 73 MMHG | HEART RATE: 116 BPM | RESPIRATION RATE: 18 BRPM | TEMPERATURE: 98 F | OXYGEN SATURATION: 96 %

## 2025-05-01 LAB
ANA TITR SER: NEGATIVE — SIGNIFICANT CHANGE UP
ANION GAP SERPL CALC-SCNC: 14 MMOL/L — SIGNIFICANT CHANGE UP (ref 7–14)
BASOPHILS # BLD AUTO: 0.18 K/UL — SIGNIFICANT CHANGE UP (ref 0–0.2)
BASOPHILS NFR BLD AUTO: 0.5 % — SIGNIFICANT CHANGE UP (ref 0–1)
BUN SERPL-MCNC: 9 MG/DL — LOW (ref 10–20)
C TRACH RRNA SPEC QL NAA+PROBE: SIGNIFICANT CHANGE UP
CALCIUM SERPL-MCNC: 8.9 MG/DL — SIGNIFICANT CHANGE UP (ref 8.4–10.5)
CHLORIDE SERPL-SCNC: 93 MMOL/L — LOW (ref 98–110)
CO2 SERPL-SCNC: 24 MMOL/L — SIGNIFICANT CHANGE UP (ref 17–32)
CREAT SERPL-MCNC: 0.8 MG/DL — SIGNIFICANT CHANGE UP (ref 0.7–1.5)
CRP SERPL-MCNC: 490.3 MG/L — HIGH
CULTURE RESULTS: SIGNIFICANT CHANGE UP
EGFR: 86 ML/MIN/1.73M2 — SIGNIFICANT CHANGE UP
EGFR: 86 ML/MIN/1.73M2 — SIGNIFICANT CHANGE UP
EOSINOPHIL # BLD AUTO: 0.05 K/UL — SIGNIFICANT CHANGE UP (ref 0–0.7)
EOSINOPHIL NFR BLD AUTO: 0.1 % — SIGNIFICANT CHANGE UP (ref 0–8)
GLUCOSE BLDC GLUCOMTR-MCNC: 250 MG/DL — HIGH (ref 70–99)
GLUCOSE SERPL-MCNC: 236 MG/DL — HIGH (ref 70–99)
HCT VFR BLD CALC: 35.5 % — LOW (ref 37–47)
HGB BLD-MCNC: 11.8 G/DL — LOW (ref 12–16)
IMM GRANULOCYTES NFR BLD AUTO: 4 % — HIGH (ref 0.1–0.3)
LYMPHOCYTES # BLD AUTO: 2.76 K/UL — SIGNIFICANT CHANGE UP (ref 1.2–3.4)
LYMPHOCYTES # BLD AUTO: 8.1 % — LOW (ref 20.5–51.1)
MCHC RBC-ENTMCNC: 29.9 PG — SIGNIFICANT CHANGE UP (ref 27–31)
MCHC RBC-ENTMCNC: 33.2 G/DL — SIGNIFICANT CHANGE UP (ref 32–37)
MCV RBC AUTO: 89.9 FL — SIGNIFICANT CHANGE UP (ref 81–99)
MONOCYTES # BLD AUTO: 1.38 K/UL — HIGH (ref 0.1–0.6)
MONOCYTES NFR BLD AUTO: 4 % — SIGNIFICANT CHANGE UP (ref 1.7–9.3)
N GONORRHOEA RRNA SPEC QL NAA+PROBE: SIGNIFICANT CHANGE UP
NEUTROPHILS # BLD AUTO: 28.4 K/UL — HIGH (ref 1.4–6.5)
NEUTROPHILS NFR BLD AUTO: 83.3 % — HIGH (ref 42.2–75.2)
NRBC BLD AUTO-RTO: 0 /100 WBCS — SIGNIFICANT CHANGE UP (ref 0–0)
PLATELET # BLD AUTO: 244 K/UL — SIGNIFICANT CHANGE UP (ref 130–400)
PMV BLD: 11.3 FL — HIGH (ref 7.4–10.4)
POTASSIUM SERPL-MCNC: 4.6 MMOL/L — SIGNIFICANT CHANGE UP (ref 3.5–5)
POTASSIUM SERPL-SCNC: 4.6 MMOL/L — SIGNIFICANT CHANGE UP (ref 3.5–5)
RBC # BLD: 3.95 M/UL — LOW (ref 4.2–5.4)
RBC # FLD: 12.7 % — SIGNIFICANT CHANGE UP (ref 11.5–14.5)
SODIUM SERPL-SCNC: 131 MMOL/L — LOW (ref 135–146)
SPECIMEN SOURCE: SIGNIFICANT CHANGE UP
SPECIMEN SOURCE: SIGNIFICANT CHANGE UP
T PALLIDUM AB TITR SER: NEGATIVE — SIGNIFICANT CHANGE UP
T PALLIDUM AB TITR SER: NEGATIVE — SIGNIFICANT CHANGE UP
WBC # BLD: 34.14 K/UL — HIGH (ref 4.8–10.8)
WBC # FLD AUTO: 34.14 K/UL — HIGH (ref 4.8–10.8)

## 2025-05-01 RX ORDER — NAPROXEN SODIUM 275 MG
250 TABLET ORAL EVERY 6 HOURS
Refills: 0 | Status: DISCONTINUED | OUTPATIENT
Start: 2025-05-01 | End: 2025-05-01

## 2025-05-01 RX ORDER — LINEZOLID 2 MG/ML
1 INJECTION, SOLUTION INTRAVENOUS
Qty: 18 | Refills: 0
Start: 2025-05-01 | End: 2025-05-09

## 2025-05-01 RX ORDER — METRONIDAZOLE 250 MG
1 TABLET ORAL
Qty: 12 | Refills: 0
Start: 2025-05-01 | End: 2025-05-06

## 2025-05-01 RX ADMIN — INSULIN LISPRO 4: 100 INJECTION, SOLUTION INTRAVENOUS; SUBCUTANEOUS at 08:15

## 2025-05-01 RX ADMIN — INSULIN LISPRO 7 UNIT(S): 100 INJECTION, SOLUTION INTRAVENOUS; SUBCUTANEOUS at 08:14

## 2025-05-01 RX ADMIN — LINEZOLID 300 MILLIGRAM(S): 2 INJECTION, SOLUTION INTRAVENOUS at 05:51

## 2025-05-01 RX ADMIN — Medication 500 MILLIGRAM(S): at 05:51

## 2025-05-01 RX ADMIN — Medication 1 APPLICATION(S): at 05:54

## 2025-05-01 NOTE — DISCHARGE NOTE PROVIDER - NSDCMRMEDTOKEN_GEN_ALL_CORE_FT
aspirin 81 mg oral tablet: 1 tab(s) orally once a day  atorvastatin 80 mg oral tablet: 1 tab(s) orally  Lantus 100 units/mL subcutaneous solution: 50 unit(s) subcutaneous once a day  metFORMIN 850 mg oral tablet: 1 tab(s) orally 2 times a day  metroNIDAZOLE 500 mg oral tablet: 1 tab(s) orally every 12 hours  Zyvox 600 mg oral tablet: 1 tab(s) orally 2 times a day MDD: 2 tab

## 2025-05-01 NOTE — DISCHARGE NOTE PROVIDER - NSDCQMSTROKE_NEU_ALL_CORE
Acute Bronchitis, Adult    Acute bronchitis is sudden (acute) swelling of the air tubes (bronchi) in the lungs. Acute bronchitis causes these tubes to fill with mucus, which can make it hard to breathe. It can also cause coughing or wheezing.  In adults, acute bronchitis usually goes away within 2 weeks. A cough caused by bronchitis may last up to 3 weeks. Smoking, allergies, and asthma can make the condition worse. Repeated episodes of bronchitis may cause further lung problems, such as chronic obstructive pulmonary disease (COPD).  What are the causes?  This condition can be caused by germs and by substances that irritate the lungs, including:  · Cold and flu viruses. This condition is most often caused by the same virus that causes a cold.  · Bacteria.  · Exposure to tobacco smoke, dust, fumes, and air pollution.  What increases the risk?  This condition is more likely to develop in people who:  · Have close contact with someone with acute bronchitis.  · Are exposed to lung irritants, such as tobacco smoke, dust, fumes, and vapors.  · Have a weak immune system.  · Have a respiratory condition such as asthma.  What are the signs or symptoms?  Symptoms of this condition include:  · A cough.  · Coughing up clear, yellow, or green mucus.  · Wheezing.  · Chest congestion.  · Shortness of breath.  · A fever.  · Body aches.  · Chills.  · A sore throat.  How is this diagnosed?  This condition is usually diagnosed with a physical exam. During the exam, your health care provider may order tests, such as chest X-rays, to rule out other conditions. He or she may also:  · Test a sample of your mucus for bacterial infection.  · Check the level of oxygen in your blood. This is done to check for pneumonia.  · Do a chest X-ray or lung function testing to rule out pneumonia and other conditions.  · Perform blood tests.  Your health care provider will also ask about your symptoms and medical history.  How is this treated?  Most  cases of acute bronchitis clear up over time without treatment. Your health care provider may recommend:  · Drinking more fluids. Drinking more makes your mucus thinner, which may make it easier to breathe.  · Taking a medicine for a fever or cough.  · Taking an antibiotic medicine.  · Using an inhaler to help improve shortness of breath and to control a cough.  · Using a cool mist vaporizer or humidifier to make it easier to breathe.  Follow these instructions at home:  Medicines  · Take over-the-counter and prescription medicines only as told by your health care provider.  · If you were prescribed an antibiotic, take it as told by your health care provider. Do not stop taking the antibiotic even if you start to feel better.  General instructions    · Get plenty of rest.  · Drink enough fluids to keep your urine pale yellow.  · Avoid smoking and secondhand smoke. Exposure to cigarette smoke or irritating chemicals will make bronchitis worse. If you smoke and you need help quitting, ask your health care provider. Quitting smoking will help your lungs heal faster.  · Use an inhaler, cool mist vaporizer, or humidifier as told by your health care provider.  · Keep all follow-up visits as told by your health care provider. This is important.  How is this prevented?  To lower your risk of getting this condition again:  · Wash your hands often with soap and water. If soap and water are not available, use hand .  · Avoid contact with people who have cold symptoms.  · Try not to touch your hands to your mouth, nose, or eyes.  · Make sure to get the flu shot every year.  Contact a health care provider if:  · Your symptoms do not improve in 2 weeks of treatment.  Get help right away if:  · You cough up blood.  · You have chest pain.  · You have severe shortness of breath.  · You become dehydrated.  · You faint or keep feeling like you are going to faint.  · You keep vomiting.  · You have a severe headache.  · Your  No fever or chills gets worse.  This information is not intended to replace advice given to you by your health care provider. Make sure you discuss any questions you have with your health care provider.  Document Released: 01/25/2006 Document Revised: 08/01/2018 Document Reviewed: 06/07/2017  [employer] Interactive Patient Education © 2019 [employer]    Sinusitis, Adult  Sinusitis is inflammation of your sinuses. Sinuses are hollow spaces in the bones around your face. Your sinuses are located:  · Around your eyes.  · In the middle of your forehead.  · Behind your nose.  · In your cheekbones.  Mucus normally drains out of your sinuses. When your nasal tissues become inflamed or swollen, mucus can become trapped or blocked. This allows bacteria, viruses, and fungi to grow, which leads to infection. Most infections of the sinuses are caused by a virus.  Sinusitis can develop quickly. It can last for up to 4 weeks (acute) or for more than 12 weeks (chronic). Sinusitis often develops after a cold.  What are the causes?  This condition is caused by anything that creates swelling in the sinuses or stops mucus from draining. This includes:  · Allergies.  · Asthma.  · Infection from bacteria or viruses.  · Deformities or blockages in your nose or sinuses.  · Abnormal growths in the nose (nasal polyps).  · Pollutants, such as chemicals or irritants in the air.  · Infection from fungi (rare).  What increases the risk?  You are more likely to develop this condition if you:  · Have a weak body defense system (immune system).  · Do a lot of swimming or diving.  · Overuse nasal sprays.  · Smoke.  What are the signs or symptoms?  The main symptoms of this condition are pain and a feeling of pressure around the affected sinuses. Other symptoms include:  · Stuffy nose or congestion.  · Thick drainage from your nose.  · Swelling and warmth over the affected sinuses.  · Headache.  · Upper toothache.  · A cough that may get worse at  night.  · Extra mucus that collects in the throat or the back of the nose (postnasal drip).  · Decreased sense of smell and taste.  · Fatigue.  · A fever.  · Sore throat.  · Bad breath.  How is this diagnosed?  This condition is diagnosed based on:  · Your symptoms.  · Your medical history.  · A physical exam.  · Tests to find out if your condition is acute or chronic. This may include:  ? Checking your nose for nasal polyps.  ? Viewing your sinuses using a device that has a light (endoscope).  ? Testing for allergies or bacteria.  ? Imaging tests, such as an MRI or CT scan.  In rare cases, a bone biopsy may be done to rule out more serious types of fungal sinus disease.  How is this treated?  Treatment for sinusitis depends on the cause and whether your condition is chronic or acute.  · If caused by a virus, your symptoms should go away on their own within 10 days. You may be given medicines to relieve symptoms. They include:  ? Medicines that shrink swollen nasal passages (topical intranasal decongestants).  ? Medicines that treat allergies (antihistamines).  ? A spray that eases inflammation of the nostrils (topical intranasal corticosteroids).  ? Rinses that help get rid of thick mucus in your nose (nasal saline washes).  · If caused by bacteria, your health care provider may recommend waiting to see if your symptoms improve. Most bacterial infections will get better without antibiotic medicine. You may be given antibiotics if you have:  ? A severe infection.  ? A weak immune system.  · If caused by narrow nasal passages or nasal polyps, you may need to have surgery.  Follow these instructions at home:  Medicines  · Take, use, or apply over-the-counter and prescription medicines only as told by your health care provider. These may include nasal sprays.  · If you were prescribed an antibiotic medicine, take it as told by your health care provider. Do not stop taking the antibiotic even if you start to feel  better.  Hydrate and humidify    · Drink enough fluid to keep your urine pale yellow. Staying hydrated will help to thin your mucus.  · Use a cool mist humidifier to keep the humidity level in your home above 50%.  · Inhale steam for 10-15 minutes, 3-4 times a day, or as told by your health care provider. You can do this in the bathroom while a hot shower is running.  · Limit your exposure to cool or dry air.  Rest  · Rest as much as possible.  · Sleep with your head raised (elevated).  · Make sure you get enough sleep each night.  General instructions    · Apply a warm, moist washcloth to your face 3-4 times a day or as told by your health care provider. This will help with discomfort.  · Wash your hands often with soap and water to reduce your exposure to germs. If soap and water are not available, use hand .  · Do not smoke. Avoid being around people who are smoking (secondhand smoke).  · Keep all follow-up visits as told by your health care provider. This is important.  Contact a health care provider if:  · You have a fever.  · Your symptoms get worse.  · Your symptoms do not improve within 10 days.  Get help right away if:  · You have a severe headache.  · You have persistent vomiting.  · You have severe pain or swelling around your face or eyes.  · You have vision problems.  · You develop confusion.  · Your neck is stiff.  · You have trouble breathing.  Summary  · Sinusitis is soreness and inflammation of your sinuses. Sinuses are hollow spaces in the bones around your face.  · This condition is caused by nasal tissues that become inflamed or swollen. The swelling traps or blocks the flow of mucus. This allows bacteria, viruses, and fungi to grow, which leads to infection.  · If you were prescribed an antibiotic medicine, take it as told by your health care provider. Do not stop taking the antibiotic even if you start to feel better.  · Keep all follow-up visits as told by your health care provider.  This is important.  This information is not intended to replace advice given to you by your health care provider. Make sure you discuss any questions you have with your health care provider.  Document Released: 12/18/2006 Document Revised: 05/20/2019 Document Reviewed: 05/20/2019  [employer] Interactive Patient Education © 2019 [employer]

## 2025-05-01 NOTE — DISCHARGE NOTE PROVIDER - NSDCCPCAREPLAN_GEN_ALL_CORE_FT
PRINCIPAL DISCHARGE DIAGNOSIS  Diagnosis: Myositis  Assessment and Plan of Treatment: continue with myositis, workup not completed

## 2025-05-01 NOTE — DISCHARGE NOTE PROVIDER - HOSPITAL COURSE
Reason for Admission: The patient was admitted with a chief complaint of constant, worsening left breast pain ongoing for three days, alongside symptoms indicative of sepsis. She has a history of insulin-dependent diabetes mellitus (IDDM) with recent non-compliance to medication and prior spontaneous left breast abscesses.    Hospital Course: Upon presentation, the patient was febrile with a temperature of 102.5°F, tachycardic with a heart rate of 139 bpm, and had a white blood cell count of 24,000/mm³. Initial evaluation included a CT scan of the chest, which showed slight enlargement of the left pectoralis major muscle with mild fat stranding, suggestive of possible myositis but no abscess formation. Urinalysis was positive for trichomonas-like cells, and there was concern for a urinary tract infection.    The patient was managed for sepsis potentially due to infectious myositis and a urinary tract infection. She received initial antibiotic coverage with vancomycin, cefepime, and clindamycin, which was later adjusted based on infectious disease recommendations. The antibiotics were switched to linezolid and metronidazole to cover for possible trichomonas infection and to continue addressing the potential myositis.    Infectious Disease Consultation: The infectious disease team recommended discontinuing vancomycin, clindamycin, and cefepime, and starting linezolid 600 mg IV every 12 hours, along with oral metronidazole 500 mg twice daily for seven days. They also recommended follow-up on blood cultures, MRSA nasal swab, and trending of white blood cell count and C-reactive protein (CRP).

## 2025-05-02 LAB
ALDOLASE SERPL-CCNC: 13.5 U/L — HIGH (ref 3.3–10.3)
HIV 1+2 AB+HIV1 P24 AG SERPL QL IA: SIGNIFICANT CHANGE UP
HIV 1+2 AB+HIV1 P24 AG SERPL QL IA: SIGNIFICANT CHANGE UP

## 2025-05-03 LAB
SSA-52 (RO52) (ENA) ANTIBODY, IGG: 4 AU/ML — SIGNIFICANT CHANGE UP (ref 0–40)
SSA-60 (RO60) (ENA) ANTIBODY, IGG: 1 AU/ML — SIGNIFICANT CHANGE UP (ref 0–40)

## 2025-05-05 LAB
CULTURE RESULTS: SIGNIFICANT CHANGE UP
SPECIMEN SOURCE: SIGNIFICANT CHANGE UP

## 2025-05-07 DIAGNOSIS — M60.08 INFECTIVE MYOSITIS, OTHER SITE: ICD-10-CM

## 2025-05-07 DIAGNOSIS — Z79.82 LONG TERM (CURRENT) USE OF ASPIRIN: ICD-10-CM

## 2025-05-07 DIAGNOSIS — Z79.84 LONG TERM (CURRENT) USE OF ORAL HYPOGLYCEMIC DRUGS: ICD-10-CM

## 2025-05-07 DIAGNOSIS — Z79.4 LONG TERM (CURRENT) USE OF INSULIN: ICD-10-CM

## 2025-05-07 DIAGNOSIS — A59.09 OTHER UROGENITAL TRICHOMONIASIS: ICD-10-CM

## 2025-05-07 DIAGNOSIS — N39.0 URINARY TRACT INFECTION, SITE NOT SPECIFIED: ICD-10-CM

## 2025-05-07 DIAGNOSIS — A41.9 SEPSIS, UNSPECIFIED ORGANISM: ICD-10-CM

## 2025-05-07 DIAGNOSIS — E11.65 TYPE 2 DIABETES MELLITUS WITH HYPERGLYCEMIA: ICD-10-CM

## 2025-05-11 ENCOUNTER — EMERGENCY (EMERGENCY)
Facility: HOSPITAL | Age: 55
LOS: 0 days | Discharge: HOME | End: 2025-05-11
Attending: STUDENT IN AN ORGANIZED HEALTH CARE EDUCATION/TRAINING PROGRAM
Payer: MEDICAID

## 2025-05-11 VITALS
SYSTOLIC BLOOD PRESSURE: 111 MMHG | DIASTOLIC BLOOD PRESSURE: 80 MMHG | OXYGEN SATURATION: 95 % | RESPIRATION RATE: 18 BRPM | TEMPERATURE: 98 F | HEIGHT: 63 IN | HEART RATE: 120 BPM | WEIGHT: 190.04 LBS

## 2025-05-11 VITALS
SYSTOLIC BLOOD PRESSURE: 117 MMHG | OXYGEN SATURATION: 97 % | RESPIRATION RATE: 20 BRPM | TEMPERATURE: 98 F | HEART RATE: 99 BPM | DIASTOLIC BLOOD PRESSURE: 76 MMHG

## 2025-05-11 DIAGNOSIS — R07.89 OTHER CHEST PAIN: ICD-10-CM

## 2025-05-11 DIAGNOSIS — E11.9 TYPE 2 DIABETES MELLITUS WITHOUT COMPLICATIONS: ICD-10-CM

## 2025-05-11 DIAGNOSIS — R00.0 TACHYCARDIA, UNSPECIFIED: ICD-10-CM

## 2025-05-11 DIAGNOSIS — F17.200 NICOTINE DEPENDENCE, UNSPECIFIED, UNCOMPLICATED: ICD-10-CM

## 2025-05-11 DIAGNOSIS — Z87.440 PERSONAL HISTORY OF URINARY (TRACT) INFECTIONS: ICD-10-CM

## 2025-05-11 LAB
ALBUMIN SERPL ELPH-MCNC: 2.6 G/DL — LOW (ref 3.3–5)
ALP SERPL-CCNC: 109 U/L — SIGNIFICANT CHANGE UP (ref 40–120)
ALT FLD-CCNC: 51 U/L — SIGNIFICANT CHANGE UP (ref 12–78)
ANION GAP SERPL CALC-SCNC: 2 MMOL/L — LOW (ref 5–17)
APPEARANCE UR: CLEAR — SIGNIFICANT CHANGE UP
AST SERPL-CCNC: 25 U/L — SIGNIFICANT CHANGE UP (ref 15–37)
BASOPHILS # BLD AUTO: 0.05 K/UL — SIGNIFICANT CHANGE UP (ref 0–0.2)
BASOPHILS NFR BLD AUTO: 0.4 % — SIGNIFICANT CHANGE UP (ref 0–2)
BILIRUB SERPL-MCNC: 0.3 MG/DL — SIGNIFICANT CHANGE UP (ref 0.2–1.2)
BILIRUB UR-MCNC: NEGATIVE — SIGNIFICANT CHANGE UP
BUN SERPL-MCNC: 7 MG/DL — SIGNIFICANT CHANGE UP (ref 7–23)
CALCIUM SERPL-MCNC: 8.3 MG/DL — LOW (ref 8.5–10.1)
CHLORIDE SERPL-SCNC: 106 MMOL/L — SIGNIFICANT CHANGE UP (ref 96–108)
CO2 SERPL-SCNC: 32 MMOL/L — HIGH (ref 22–31)
COLOR SPEC: YELLOW — SIGNIFICANT CHANGE UP
CREAT SERPL-MCNC: 0.79 MG/DL — SIGNIFICANT CHANGE UP (ref 0.5–1.3)
D DIMER BLD IA.RAPID-MCNC: 203 NG/ML DDU — SIGNIFICANT CHANGE UP
DIFF PNL FLD: NEGATIVE — SIGNIFICANT CHANGE UP
EGFR: 88 ML/MIN/1.73M2 — SIGNIFICANT CHANGE UP
EGFR: 88 ML/MIN/1.73M2 — SIGNIFICANT CHANGE UP
EOSINOPHIL # BLD AUTO: 0.14 K/UL — SIGNIFICANT CHANGE UP (ref 0–0.5)
EOSINOPHIL NFR BLD AUTO: 1.1 % — SIGNIFICANT CHANGE UP (ref 0–6)
GLUCOSE SERPL-MCNC: 216 MG/DL — HIGH (ref 70–99)
GLUCOSE UR QL: NEGATIVE MG/DL — SIGNIFICANT CHANGE UP
HCT VFR BLD CALC: 36.2 % — SIGNIFICANT CHANGE UP (ref 34.5–45)
HGB BLD-MCNC: 11.8 G/DL — SIGNIFICANT CHANGE UP (ref 11.5–15.5)
IMM GRANULOCYTES NFR BLD AUTO: 0.5 % — SIGNIFICANT CHANGE UP (ref 0–0.9)
KETONES UR-MCNC: NEGATIVE MG/DL — SIGNIFICANT CHANGE UP
LEUKOCYTE ESTERASE UR-ACNC: NEGATIVE — SIGNIFICANT CHANGE UP
LIDOCAIN IGE QN: 23 U/L — SIGNIFICANT CHANGE UP (ref 13–75)
LYMPHOCYTES # BLD AUTO: 16.6 % — SIGNIFICANT CHANGE UP (ref 13–44)
LYMPHOCYTES # BLD AUTO: 2.1 K/UL — SIGNIFICANT CHANGE UP (ref 1–3.3)
MCHC RBC-ENTMCNC: 30.2 PG — SIGNIFICANT CHANGE UP (ref 27–34)
MCHC RBC-ENTMCNC: 32.6 G/DL — SIGNIFICANT CHANGE UP (ref 32–36)
MCV RBC AUTO: 92.6 FL — SIGNIFICANT CHANGE UP (ref 80–100)
MONOCYTES # BLD AUTO: 0.49 K/UL — SIGNIFICANT CHANGE UP (ref 0–0.9)
MONOCYTES NFR BLD AUTO: 3.9 % — SIGNIFICANT CHANGE UP (ref 2–14)
NEUTROPHILS # BLD AUTO: 9.81 K/UL — HIGH (ref 1.8–7.4)
NEUTROPHILS NFR BLD AUTO: 77.5 % — HIGH (ref 43–77)
NITRITE UR-MCNC: NEGATIVE — SIGNIFICANT CHANGE UP
NRBC BLD AUTO-RTO: 0 /100 WBCS — SIGNIFICANT CHANGE UP (ref 0–0)
NT-PROBNP SERPL-SCNC: 109 PG/ML — SIGNIFICANT CHANGE UP (ref 0–125)
PH UR: 6.5 — SIGNIFICANT CHANGE UP (ref 5–8)
PLATELET # BLD AUTO: 413 K/UL — HIGH (ref 150–400)
POTASSIUM SERPL-MCNC: 3.6 MMOL/L — SIGNIFICANT CHANGE UP (ref 3.5–5.3)
POTASSIUM SERPL-SCNC: 3.6 MMOL/L — SIGNIFICANT CHANGE UP (ref 3.5–5.3)
PROT SERPL-MCNC: 7.3 GM/DL — SIGNIFICANT CHANGE UP (ref 6–8.3)
PROT UR-MCNC: NEGATIVE MG/DL — SIGNIFICANT CHANGE UP
RBC # BLD: 3.91 M/UL — SIGNIFICANT CHANGE UP (ref 3.8–5.2)
RBC # FLD: 13.9 % — SIGNIFICANT CHANGE UP (ref 10.3–14.5)
SODIUM SERPL-SCNC: 140 MMOL/L — SIGNIFICANT CHANGE UP (ref 135–145)
SP GR SPEC: 1.01 — SIGNIFICANT CHANGE UP (ref 1–1.03)
TROPONIN I, HIGH SENSITIVITY RESULT: 8.4 NG/L — SIGNIFICANT CHANGE UP
UROBILINOGEN FLD QL: 0.2 MG/DL — SIGNIFICANT CHANGE UP (ref 0.2–1)
WBC # BLD: 12.65 K/UL — HIGH (ref 3.8–10.5)
WBC # FLD AUTO: 12.65 K/UL — HIGH (ref 3.8–10.5)

## 2025-05-11 PROCEDURE — 71045 X-RAY EXAM CHEST 1 VIEW: CPT | Mod: 26

## 2025-05-11 PROCEDURE — 99285 EMERGENCY DEPT VISIT HI MDM: CPT

## 2025-05-11 PROCEDURE — 93010 ELECTROCARDIOGRAM REPORT: CPT

## 2025-05-11 PROCEDURE — 76604 US EXAM CHEST: CPT | Mod: 26

## 2025-05-11 RX ORDER — ACETAMINOPHEN 500 MG/5ML
1000 LIQUID (ML) ORAL ONCE
Refills: 0 | Status: COMPLETED | OUTPATIENT
Start: 2025-05-11 | End: 2025-05-11

## 2025-05-11 RX ORDER — IBUPROFEN 200 MG
1 TABLET ORAL
Qty: 15 | Refills: 0
Start: 2025-05-11 | End: 2025-05-15

## 2025-05-11 RX ADMIN — Medication 1000 MILLIGRAM(S): at 14:50

## 2025-05-11 RX ADMIN — Medication 400 MILLIGRAM(S): at 13:48

## 2025-05-11 NOTE — ED ADULT NURSE NOTE - CHIEF COMPLAINT QUOTE
C/o persistent 6/10 pain to L breast. States she was recently at [facility] d/t same pain but was told it may be muscular. Denies discharge, redness, fever  PMH DM  NKDA  hr 120, states she is anxious

## 2025-05-11 NOTE — ED PROVIDER NOTE - PROGRESS NOTE DETAILS
Patient reassessed, feels well, discussed with patient follow-up with the breast specialist, no acute collection seen on ultrasound, no clinical signs of abscess, patient given return precautions.  Stable for discharge. reproducible chest wall tenderness, return precautions discussed

## 2025-05-11 NOTE — ED ADULT NURSE NOTE - NSFALLUNIVINTERV_ED_ALL_ED
Bed/Stretcher in lowest position, wheels locked, appropriate side rails in place/Call bell, personal items and telephone in reach/Instruct patient to call for assistance before getting out of bed/chair/stretcher/Non-slip footwear applied when patient is off stretcher/[place] to call system/Physically safe environment - no spills, clutter or unnecessary equipment/Purposeful proactive rounding/Room/bathroom lighting operational, light cord in reach

## 2025-05-11 NOTE — ED PROVIDER NOTE - PATIENT PORTAL LINK FT
You can access the FollowMyHealth Patient Portal offered by [facility] by registering at the following website: [email or web address]. By joining [facility]’s FollowMyHealth portal, you will also be able to view your health information using other applications (apps) compatible with our system.

## 2025-05-11 NOTE — ED ADULT TRIAGE NOTE - CHIEF COMPLAINT QUOTE
C/o persistent 6/10 pain to L breast. States she was recently at [facility] d/t same pain but was told it may be muscular. Denies discharge, redness, fever  PMH DM  NKDA  hr 120, states she is anxious

## 2025-05-11 NOTE — ED PROVIDER NOTE - PHYSICAL EXAMINATION
Gen: aox3, NAD   Head: NCAT  ENT: Airway patent, moist mucous membranes, nasal passageways clear   Cardiac: Normal rate, normal rhythm   Respiratory: Lungs CTA B/L  Gastrointestinal: Abdomen soft, nontender, nondistended, no rebound, no guarding  MSK: No gross abnormalities, FROM of all four extremities, no edema, + left chest wall with mild ttp left anterior pectoralis major, no lesions or induration or fluctuance of breast tissue, chaperoned by RN Carol Ann Valenzuela  HEME: Extremities warm and well perfused   Skin: No rashes, no lesions  Neuro: No gross neurologic deficits

## 2025-05-11 NOTE — ED PROVIDER NOTE - CLINICAL SUMMARY MEDICAL DECISION MAKING FREE TEXT BOX
54-year-old female with a past medical history of diabetes, prior left breast myositis versus abscess, who presents for evaluation of left anterior chest wall pain, onset today, patient recently finished antibiotic course of metronidazole.  She was admitted to [facility] 5/1/25- 4/29/25 for left breast abscess versus mastitis.  Patient at that time had fever and elevated white cell count met sepsis criteria.  She was also found to have UTI.  She was treated with IV antibiotics and was discharged on linezolid and metronidazole.  She reports that the insurance never approved the linezolid so she never took it.  She reports compliance with her oral regimen and states that her glucose has been well-controlled.  She reports that her prior left breast abscess was underneath her breasts and this time she noticed pain described as muscle tightness at the top of the breast.  She denies any associated trauma or fevers this time.  Physical exam without significant fluctuance but some mild tenderness in the left anterior chest wall, no lesions on the breast.  Patient nontoxic-appearing.  History from recent hospitalization report reviewed, MRN [id number]. Pt also requesting Urine test as reports wants to see if urine infection cleared.  physical exam as above  plan - US left anterior chest to eval for any fluid collection, no obvious abscess on eval, ddimer rule out PE, check troponin, ekg nonischemic, labs to eval for leukocytosis/metabolic derangements, check UA

## 2025-05-11 NOTE — ED PROVIDER NOTE - WR ORDER NAME 1
I have not seen this pt yet. 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 Asked Laverne MARTINEZ to call pt to drop off MRI disc for review.     Xray Chest 1 View- PORTABLE-Urgent

## 2025-05-11 NOTE — ED ADULT NURSE NOTE - OBJECTIVE STATEMENT
c/o worsening left breast tissue pain and tightness  , states she was treated at [facility] for similar complaints with antibiotics but pain was not completely resolved. Denies swelling, redness or drainage, no fever or sob.

## 2025-05-11 NOTE — ED PROVIDER NOTE - NSFOLLOWUPINSTRUCTIONS_ED_ALL_ED_FT
You were seen today for chest pain --we suspect local muscle inflammation but return immediately for any fever, swelling, shortness of breath or worsening symptoms    Follow up with a breast specialist for further management and care     Follow up with your cardiologist and primary care doctor.         Chest Pain    Chest pain can be caused by many different conditions which may or may not be dangerous. Causes include heartburn, lung infections, heart attack, blood clot in lungs, skin infections, strain or damage to muscle, cartilage, or bones, etc. In addition to a history and physical examination, an electrocardiogram (ECG) or other lab tests may have been performed to determine the cause of your chest pain. Follow up with your primary care provider or with a cardiologist as instructed.     SEEK IMMEDIATE MEDICAL CARE IF YOU HAVE ANY OF THE FOLLOWING SYMPTOMS: worsening chest pain, coughing up blood, unexplained back/neck/jaw pain, severe abdominal pain, dizziness or lightheadedness, fainting, shortness of breath, sweaty or clammy skin, vomiting, or racing heart beat. These symptoms may represent a serious problem that is an emergency. Do not wait to see if the symptoms will go away. Get medical help right away. Call 911 and do not drive yourself to the hospital.

## 2025-05-22 LAB — MI2 AB SER QL: NEGATIVE — SIGNIFICANT CHANGE UP

## 2025-06-24 NOTE — ED ADULT NURSE NOTE - HOW OFTEN DO YOU HAVE A DRINK CONTAINING ALCOHOL?
Cardiology Follow Up    Ramona Cabral  1955  [id number]  [facility] [place] [id number]  2ND FLO[address]  [facility] [address] [phone]    Ena presents for follow up of PAF, HTN, HLD, edema    1. Paroxysmal atrial fibrillation (HCC)  Assessment & Plan:  Patient with new onset atrial fibrillation 11/25/2020.  Patient presented to the emergency room after onset of “fluttering” and rapid heart rate.  She converted spontaneously to  normal sinus rhythm.  She has remained in normal sinus rhythm since and feels well.  [id number] Score 3   Eliquis 5 mg twice daily for stroke prophylaxis.   Home sleep study notable for severe sleep apnea being treated with CPAP.   48 hour Holter monitor 6/8/2022 showed NSR with 5 short AT runs.   Echocardiogram 4/3/2023 with preserved LVEF and no significant valvular abnormality.  Breakthrough a fib episode on 2/1/24.  ZIO 2/2024 shows SR with avg HR 55 bpm and no recurrent a fib.  Changed metoprolol succinate to 12.5 mg BID. Reviewed importance of mag/potassium replacement. Symptoms well controlled with this change.  Orders:  -     apixaban (Eliquis) 5 mg; Take 1 tablet (5 mg total) by mouth 2 (two) times a day  2. Essential hypertension  Assessment & Plan:  Blood pressure is improved on current regimen.  Home BP cuff accuracy has been verified with excellent home readings.  Continue amlodipine 2.5 mg daily, olmesartan 40 mg daily, metoprolol succinate 25 mg daily, and HCTZ 12.5 mg daily.  Recent BMP reviewed  Orders:  -     amLODIPine (NORVASC) 2.5 mg tablet; Take 1 tablet (2.5 mg total) by mouth daily  -     hydroCHLOROthiazide 12.5 mg tablet; Take 1 tablet (12.5 mg total) by mouth daily  -     olmesartan (BENICAR) 40 mg tablet; Take 1 tablet (40 mg total) by mouth daily  3. Mixed hyperlipidemia  Assessment & Plan:  Lipid panel 2/6/2024: C 160. T 156. H 67. L 62.  Continue atorvastatin 10 mg daily.  LDL 81 on 1/2025  labs.  4. Stage 3a chronic kidney disease (HCC)  Assessment & Plan:  Lab Results   Component Value Date    EGFR 51 06/05/2025    EGFR 36 05/14/2025    EGFR 50 04/11/2025    CREATININE 1.08 06/05/2025    CREATININE 1.45 (H) 05/14/2025    CREATININE 1.11 04/11/2025     Improving.  Working on BP management  5. Obstructive sleep apnea syndrome  Assessment & Plan:  Home sleep study 12/2020 revealed severe sleep apnea  Compliant with CPAP therapy.  Following with [facility] sleep medicine.  6. Class 3 obesity  Assessment & Plan:  Body mass index is 43.85 kg/m².  Reviewed dietary and exercise modifications.  Reviewed importance of weight loss for symptom control.  She has met with weight management and declined prescription treatment.  I have offered referral to PT for treatment of deconditioning, which she will consider.  7. Localized edema  Comments:  Likely worsened by CCB. Improved with HCTZ 12.5 mg daily. Awaiting venous insufficiency study. Reduced sodium diet reviewed.     PAM Sutherland has a past medical history of paroxysmal atrial fibrillation, HTN, HLD, DEANNA on CPAP, hypothyroidism, anxiety/depression, and obesity.     She initially presented to [facility] 11/25/2020 with chest pain, palpitations, and shortness of breath. Found to be in atrial fibrillation with RVR, rate 118 bpm. CTA ruled out PE. She was treated with IV Lopressor and Cardiazem and spontaneously converted to NSR. She was evaluated by Dr. Rojo during admission and ultimately discharged home on metoprolol succinate 25 mg daily and Eliquis 5 mg BID. Her TSH was mildly elevated and levothyroxine increased to 100 mch from 88 mcg during admission. Magnesium supplementation was initiated. She was under significant stress at the time with a recent cancer diagnosis for her spouse. He had just returned home from a complicated bowel surgery.     Echo 12/2/2020 was unremarkable.   Nuclear stress test 12/2/20 showed no scar or ischemia with EF 73%.     She  "completed a sleep study 3/4/2021 which was positive for DEANNA. She was referred to sleep medicine and underwent in-lab study 4/8/2021 and CPAP therapy was initiated.      Echocardiogram 4/3/2023 showed preserved LVEF with no concerning findings.      She was evaluated at [facility] ER 2/1/2024 when she presented via EMS due to chest pain and heart racing which started overnight. She took her metoprolol at 6 am. Upon ER arrival her ECG showed SR at 71 bpm. Lab work was unrevealing other than magnesium of 1.8. Chest xray WNL. She was given IV mag 2 g and ultimately discharged home. She followed up with me at which time she reported no recurrent symptoms today. She states she did not feel palpitations but did feel \"unsettled\" while her HR was irregular and rapid. She denies any missed doses of metoprolol. She states she had been out of her magnesium supplement for about 1 week and questions if this caused breakthrough A fib. She has been wearing her CPAP faithfully. Her metoprolol was changed to 12.5 mg BID and an updated ZIO was ordered.     ZIO showed sinus rhythm, HR , avg 55 bpm. 12 runs of SVT, longest 11.6 seconds with avg  bpm. Rare PAC's and rare PVC's. No atrial fibrillation detected.    She followed up most recently 4/14/2025. She was undergoing med adjustments for BP and progressive CKD. We opted to discontinue HCTZ, however she developed hypertension and lower leg edema.    6/24/2025: Ena presents for close follow up. BP is excellent today and per recent home checks. She is currently on amlodipine 2.5 mg daily, olmesartan 40 mg daily, metoprolol succinate 25 mg daily, and HCTZ 12.5 mg daily. Recent BMP shows improvement in renal function. She states her leg swelling is much improved. She has been much more careful with her sodium intake. She denies chest pain, palpitations, shortness of breath. She is scheduled for a venous insufficiency study this month.     Medical Problems       Problem List       " Anxiety and depression    Obesity hypoventilation syndrome (HCC)    Reactive airway disease    Shortness of breath    Paroxysmal atrial fibrillation (HCC)    Essential hypertension    Hyperlipidemia    Hypothyroidism due to Hashimoto's thyroiditis    Obstructive sleep apnea syndrome    Class 3 obesity    Post-COVID chronic dyspnea    History of COVID-19    Stage 3a chronic kidney disease (HCC)        Past Medical History[1]  Social History     Socioeconomic History    Marital status: /Civil Union     Spouse name: Not on file    Number of children: Not on file    Years of education: Not on file    Highest education level: Not on file   Occupational History    Occupation: Retired teacher   Tobacco Use    Smoking status: Never    Smokeless tobacco: Never   Vaping Use    Vaping status: Never Used   Substance and Sexual Activity    Alcohol use: Not Currently    Drug use: Never    Sexual activity: Not Currently     Partners: Male   Other Topics Concern    Not on file   Social History Narrative    Not on file     Social Drivers of Health     Financial Resource Strain: Not At Risk (3/5/2025)    Received from [facility]    Financial Insecurity     In the last 12 months did you skip medications to save money?: No     In the last 12 months was there a time when you needed to see a doctor but could not because of cost?: No   Food Insecurity: No Food Insecurity (3/5/2025)    Received from [facility]    Food Insecurity     In the last 12 months did you ever eat less than you felt you should because there wasn't enough money for food?: No   Transportation Needs: No Transportation Needs (3/5/2025)    Received from [facility]    Transportation Needs     In the last 12 months have you ever had to go without healthcare because you didn't have a way to get there?: No   Physical Activity: Not on file   Stress: Stress Concern Present (3/10/2024)    [facility] from [facility]    [facility] of Occupational Health - Occupational Stress Questionnaire     Feeling of Stress : Very much   Social Connections: Socially Isolated (3/5/2025)    Received from [facility]    Social Connection     Do you often feel lonely?: Yes   Intimate Partner Violence: Not At Risk (3/10/2024)    Received from [facility]    Humiliation, Afraid, Rape, and Kick questionnaire     Within the last year, have you been afraid of your partner or ex-partner?: No     Within the last year, have you been humiliated or emotionally abused in other ways by your partner or ex-partner?: No     Within the last year, have you been kicked, hit, slapped, or otherwise physically hurt by your partner or ex-partner?: No     Within the last year, have you been raped or forced to have any kind of sexual activity by your partner or ex-partner?: No   Housing Stability: Not At Risk (3/5/2025)    Received from [facility]    Housing Stability     Are you worried that in the next 2 months you may not have stable housing?: No      Family History[2]  Past Surgical History[3]  Current Medications[4]  Allergies   Allergen Reactions    Flagyl [Metronidazole] Shortness Of Breath     Pt requested addition to allergies    Contrast [Iodinated Contrast Media] Rash    Iodine - Food Allergy Rash       Labs:     Chemistry        Component Value Date/Time     11/06/2015 0655    K 4.4 06/05/2025 1110    K 4.3 03/06/2025 1252     06/05/2025 1110     03/06/2025 1252    CO2 25 06/05/2025 1110    CO2 27 03/06/2025 1252    BUN 22 06/05/2025 1110    BUN 28 (H) 03/06/2025 1252    CREATININE 1.08 06/05/2025 1110    CREATININE 1.28 (H) 03/06/2025 1252        Component Value Date/Time    CALCIUM 9.6 06/05/2025 1110    CALCIUM 9.6 03/06/2025 1252    ALKPHOS 77 05/14/2025 0547    ALKPHOS 76 02/02/2025 2154    AST 29 05/14/2025 0547    AST 23 02/02/2025 2154    ALT 25 05/14/2025  0547    ALT 18 02/02/2025 2154    BILITOT 0.34 11/06/2015 0655        Lipid panel 1/24/2025: C 165. T 102. H 64. L 81.   Lipid panel 2/6/2024: C 160. T 156.  H 67. L 62.      Cardiac Test Results:   ECG 4/11/2025: Normal sinus rhythm. Normal ECG. Rate 61 bpm.      ZIO 2/13-2/26/2024:  Sinus rhythm, HR , avg 55 bpm.  12 runs of SVT, longest 11.6 seconds with avg  bpm.  Rare PAC's/PVC's.     ECG 2/6/2024: Normal sinus rhythm. Normal ECG. Rate 61 bpm.     Echocardiogram 4/3/2023:  EF 70%. Mild LVH.      ECG 3/24/2023: Sinus bradycardia. Rate 59 bpm with no significant change from prior.     ECG 10/19/2022: Sinus bradycardia. Otherwise normal ECG.      Holter monitor (48 hours) 6/8/2022:   Min HR 47. Max HR 98. Avg HR 60.   Rare PVC's. Rare PAC's.   5 brief atrial runs with the longest lasting 5 beats.   Palpitations correlated with PAC's/PVC's and atrial runs.      ECG 6/7/2022: Sinus bradycardia. 58 bpm.      Lipid panel 12/13/2021: C 176. T 177. H 65. L 76.      ECG 11/25/2020:  Atrial flutter/atrial fibrillation at 118 bpm.     Echocardiogram 12/02/2020:  EF 60%.  Normal diastolic function.  Mild annular calcification of the mitral valve.  Trace tricuspid regurgitation.     Lexiscan nuclear stress test 12/02/2020:  No evidence of myocardial scar.  No evidence of myocardial ischemia.  EF 73%.     Lipid panel 10/13/2020: C 166. T 154. H 64. L 71.      Echocardiogram 11/05/2015:  EF 55-60%.  Mild tricuspid regurgitation.  Estimated PASP 38 mmHg.     Review of Systems   Constitutional: Negative.   HENT: Negative.     Cardiovascular:  Positive for leg swelling (improved). Negative for chest pain, dyspnea on exertion, irregular heartbeat, near-syncope, orthopnea and palpitations.   Respiratory:  Negative for cough and snoring.    Endocrine: Negative.    Skin: Negative.    Musculoskeletal: Negative.    Gastrointestinal: Negative.    Genitourinary: Negative.    Neurological: Negative.    Psychiatric/Behavioral:  "Negative.         Vitals:    06/24/25 1548   BP: 128/70   Pulse: 58   Temp: 98 °F (36.7 °C)   SpO2: 96%     Vitals:    06/24/25 1548   Weight: 127 kg (280 lb)     Height: 5' 7\" (170.2 cm)   Body mass index is 43.85 kg/m².    Physical Exam  Vitals and nursing note reviewed.   Constitutional:       General: She is not in acute distress.     Appearance: She is well-developed. She is obese. She is not diaphoretic.   HENT:      Head: Normocephalic and atraumatic.   Neck:      Thyroid: No thyromegaly.      Vascular: No carotid bruit or JVD.     Cardiovascular:      Rate and Rhythm: Normal rate and regular rhythm.      Pulses: Intact distal pulses.           Radial pulses are 2+ on the right side and 2+ on the left side.      Heart sounds: Normal heart sounds, S1 normal and S2 normal. No murmur heard.     Comments: Trivial lower leg edema  Pulmonary:      Effort: Pulmonary effort is normal.      Breath sounds: Normal breath sounds.   Abdominal:      General: There is no distension.      Palpations: Abdomen is soft.      Tenderness: There is no abdominal tenderness.     Musculoskeletal:         General: Normal range of motion.      Cervical back: Normal range of motion and neck supple.   Lymphadenopathy:      Cervical: No cervical adenopathy.     Skin:     General: Skin is warm and dry.     Neurological:      Mental Status: She is alert and oriented to person, place, and time.      Cranial Nerves: No cranial nerve deficit.     Psychiatric:         Behavior: Behavior normal.                     [1]   Past Medical History:  Diagnosis Date    Anxiety 2000    Arthritis Unknown    Atrial fibrillation (HCC)     Cataract     Disease of thyroid gland Unknown    Elevated d-dimer     2 seperate episodes of unclear etiology.    Fatty liver Unknown    GERD (gastroesophageal reflux disease) Unknown    Heart murmur     Hyperlipidemia     Hypertension     Hypothyroid     Obesity     Paroxysmal atrial fibrillation (HCC)     New onset " 2020.    Sinus problem Unknown    Sinusitis     Sleep apnea, obstructive    [2]   Family History  Problem Relation Name Age of Onset    Hypertension Sister Nolvia     Arthritis Sister Nolvia     Heart disease Sister Nolvia     Heart disease Paternal Aunt      Other Mother          Encephalitis    Other Father Dontae          in his 90s without significant problems    Arthritis Father Dontae     Hearing loss Father Dontae     Heart disease Father Dontae     Snoring Father Dontae     Coronary artery disease Father Dontae     Lung cancer Brother Nicolasa     Hypertension Brother Nicolasa     Cancer Brother Jayesh     Lung cancer Brother Jayesh     Diabetes Maternal Aunt Estee     Diabetes Maternal Uncle Bola    [3]   Past Surgical History:  Procedure Laterality Date    CATARACT EXTRACTION, BILATERAL      COLONOSCOPY      EYE SURGERY      HYSTERECTOMY      KNEE ARTHROPLASTY      SHOULDER OPEN ROTATOR CUFF REPAIR      x 2    TONSILLECTOMY      UPPER GASTROINTESTINAL ENDOSCOPY     [4]   Current Outpatient Medications:     albuterol (ProAir HFA) 90 mcg/act inhaler, Inhale 2 puffs every 6 (six) hours as needed for wheezing, Disp: 8.5 g, Rfl: 0    ALPRAZolam (XANAX) 0.5 mg tablet, Take by mouth daily at bedtime as needed for anxiety, Disp: , Rfl:     amLODIPine (NORVASC) 2.5 mg tablet, Take 1 tablet (2.5 mg total) by mouth daily, Disp: 90 tablet, Rfl: 3    apixaban (Eliquis) 5 mg, Take 1 tablet (5 mg total) by mouth 2 (two) times a day, Disp: 180 tablet, Rfl: 3    atorvastatin (LIPITOR) 10 mg tablet, Take 10 mg by mouth in the morning., Disp: , Rfl:     citalopram (CeleXA) 40 mg tablet, Take 40 mg by mouth in the morning., Disp: , Rfl:     clindamycin (CLEOCIN) 2 % vaginal cream, , Disp: , Rfl:     clobetasol (TEMOVATE) 0.05 % ointment, , Disp: , Rfl:     clotrimazole-betamethasone (LOTRISONE) 1-0.05 % cream, , Disp: , Rfl:     desonide (DESOWEN) 0.05 % cream, , Disp: , Rfl:     dicyclomine (BENTYL) 20 mg  tablet, Take 1 tablet (20 mg total) by mouth every 6 (six) hours as needed (abdominal crampin), Disp: 40 tablet, Rfl: 0    diphenoxylate-atropine (LOMOTIL) 2.5-0.025 mg per tablet, Take 1 tablet by mouth as needed in the morning and 1 tablet as needed at noon and 1 tablet as needed in the evening and 1 tablet as needed before bedtime., Disp: , Rfl:     hydroCHLOROthiazide 12.5 mg tablet, Take 1 tablet (12.5 mg total) by mouth daily, Disp: 90 tablet, Rfl: 3    ketoconazole (NIZORAL) 2 % cream, Apply 1 Application topically as needed in the morning and 1 Application as needed in the evening., Disp: , Rfl:     levothyroxine 75 mcg tablet, Take 75 mcg by mouth in the morning., Disp: , Rfl:     loperamide (IMODIUM) 2 mg capsule, Take 1 capsule (2 mg total) by mouth 4 (four) times a day as needed for diarrhea, Disp: 12 capsule, Rfl: 0    magnesium oxide (MAG-OX) 400 mg tablet, Take by mouth, Disp: , Rfl:     metoprolol succinate (TOPROL-XL) 25 mg 24 hr tablet, TAKE 1 TABLET DAILY, Disp: 90 tablet, Rfl: 1    nystatin powder, if needed, Disp: , Rfl:     olmesartan (BENICAR) 40 mg tablet, Take 1 tablet (40 mg total) by mouth daily, Disp: 90 tablet, Rfl: 3    ondansetron (ZOFRAN) 4 mg tablet, Take 4 mg by mouth every 8 (eight) hours as needed for nausea, Disp: , Rfl:      Monthly or less

## 2025-07-30 ENCOUNTER — EMERGENCY (EMERGENCY)
Facility: HOSPITAL | Age: 55
LOS: 0 days | Discharge: HOME | End: 2025-07-31
Attending: STUDENT IN AN ORGANIZED HEALTH CARE EDUCATION/TRAINING PROGRAM
Payer: MEDICAID

## 2025-07-30 VITALS
DIASTOLIC BLOOD PRESSURE: 78 MMHG | WEIGHT: 199.96 LBS | RESPIRATION RATE: 18 BRPM | TEMPERATURE: 100 F | OXYGEN SATURATION: 99 % | HEART RATE: 115 BPM | SYSTOLIC BLOOD PRESSURE: 130 MMHG | HEIGHT: 63 IN

## 2025-07-30 LAB
ALBUMIN SERPL ELPH-MCNC: 3.3 G/DL — SIGNIFICANT CHANGE UP (ref 3.3–5)
ALP SERPL-CCNC: 88 U/L — SIGNIFICANT CHANGE UP (ref 40–120)
ALT FLD-CCNC: 25 U/L — SIGNIFICANT CHANGE UP (ref 12–78)
ANION GAP SERPL CALC-SCNC: 5 MMOL/L — SIGNIFICANT CHANGE UP (ref 5–17)
APPEARANCE UR: CLEAR — SIGNIFICANT CHANGE UP
APTT BLD: 29.8 SEC — SIGNIFICANT CHANGE UP (ref 26.1–36.8)
AST SERPL-CCNC: 21 U/L — SIGNIFICANT CHANGE UP (ref 15–37)
BASOPHILS # BLD AUTO: 0.03 K/UL — SIGNIFICANT CHANGE UP (ref 0–0.2)
BASOPHILS NFR BLD AUTO: 0.4 % — SIGNIFICANT CHANGE UP (ref 0–2)
BILIRUB SERPL-MCNC: 0.1 MG/DL — LOW (ref 0.2–1.2)
BILIRUB UR-MCNC: NEGATIVE — SIGNIFICANT CHANGE UP
BUN SERPL-MCNC: 12 MG/DL — SIGNIFICANT CHANGE UP (ref 7–23)
CALCIUM SERPL-MCNC: 9.2 MG/DL — SIGNIFICANT CHANGE UP (ref 8.5–10.1)
CHLORIDE SERPL-SCNC: 106 MMOL/L — SIGNIFICANT CHANGE UP (ref 96–108)
CO2 SERPL-SCNC: 30 MMOL/L — SIGNIFICANT CHANGE UP (ref 22–31)
COLOR SPEC: YELLOW — SIGNIFICANT CHANGE UP
CREAT SERPL-MCNC: 0.93 MG/DL — SIGNIFICANT CHANGE UP (ref 0.5–1.3)
DIFF PNL FLD: NEGATIVE — SIGNIFICANT CHANGE UP
EGFR: 72 ML/MIN/1.73M2 — SIGNIFICANT CHANGE UP
EGFR: 72 ML/MIN/1.73M2 — SIGNIFICANT CHANGE UP
EOSINOPHIL # BLD AUTO: 0.28 K/UL — SIGNIFICANT CHANGE UP (ref 0–0.5)
EOSINOPHIL NFR BLD AUTO: 4 % — SIGNIFICANT CHANGE UP (ref 0–6)
FLUAV AG NPH QL: SIGNIFICANT CHANGE UP
FLUBV AG NPH QL: SIGNIFICANT CHANGE UP
GLUCOSE SERPL-MCNC: 201 MG/DL — HIGH (ref 70–99)
GLUCOSE UR QL: NEGATIVE MG/DL — SIGNIFICANT CHANGE UP
HCT VFR BLD CALC: 35.1 % — SIGNIFICANT CHANGE UP (ref 34.5–45)
HGB BLD-MCNC: 12.3 G/DL — SIGNIFICANT CHANGE UP (ref 11.5–15.5)
IMM GRANULOCYTES NFR BLD AUTO: 0.4 % — SIGNIFICANT CHANGE UP (ref 0–0.9)
INR BLD: 1.02 RATIO — SIGNIFICANT CHANGE UP (ref 0.85–1.16)
KETONES UR QL: NEGATIVE MG/DL — SIGNIFICANT CHANGE UP
LACTATE SERPL-SCNC: 1.1 MMOL/L — SIGNIFICANT CHANGE UP (ref 0.7–2)
LEUKOCYTE ESTERASE UR-ACNC: NEGATIVE — SIGNIFICANT CHANGE UP
LYMPHOCYTES # BLD AUTO: 2.05 K/UL — SIGNIFICANT CHANGE UP (ref 1–3.3)
LYMPHOCYTES # BLD AUTO: 29.3 % — SIGNIFICANT CHANGE UP (ref 13–44)
MCHC RBC-ENTMCNC: 31.8 PG — SIGNIFICANT CHANGE UP (ref 27–34)
MCHC RBC-ENTMCNC: 35 G/DL — SIGNIFICANT CHANGE UP (ref 32–36)
MCV RBC AUTO: 90.7 FL — SIGNIFICANT CHANGE UP (ref 80–100)
MONOCYTES # BLD AUTO: 0.59 K/UL — SIGNIFICANT CHANGE UP (ref 0–0.9)
MONOCYTES NFR BLD AUTO: 8.4 % — SIGNIFICANT CHANGE UP (ref 2–14)
NEUTROPHILS # BLD AUTO: 4.02 K/UL — SIGNIFICANT CHANGE UP (ref 1.8–7.4)
NEUTROPHILS NFR BLD AUTO: 57.5 % — SIGNIFICANT CHANGE UP (ref 43–77)
NITRITE UR-MCNC: NEGATIVE — SIGNIFICANT CHANGE UP
NRBC BLD AUTO-RTO: 0 /100 WBCS — SIGNIFICANT CHANGE UP (ref 0–0)
PH UR: 6.5 — SIGNIFICANT CHANGE UP (ref 5–8)
PLATELET # BLD AUTO: 254 K/UL — SIGNIFICANT CHANGE UP (ref 150–400)
POTASSIUM SERPL-MCNC: 3.4 MMOL/L — LOW (ref 3.5–5.3)
POTASSIUM SERPL-SCNC: 3.4 MMOL/L — LOW (ref 3.5–5.3)
PROT SERPL-MCNC: 7.4 GM/DL — SIGNIFICANT CHANGE UP (ref 6–8.3)
PROT UR-MCNC: NEGATIVE MG/DL — SIGNIFICANT CHANGE UP
PROTHROM AB SERPL-ACNC: 11.5 SEC — SIGNIFICANT CHANGE UP (ref 9.9–13.4)
RBC # BLD: 3.87 M/UL — SIGNIFICANT CHANGE UP (ref 3.8–5.2)
RBC # FLD: 13.1 % — SIGNIFICANT CHANGE UP (ref 10.3–14.5)
RSV RNA NPH QL NAA+NON-PROBE: SIGNIFICANT CHANGE UP
SARS-COV-2 RNA SPEC QL NAA+PROBE: SIGNIFICANT CHANGE UP
SODIUM SERPL-SCNC: 141 MMOL/L — SIGNIFICANT CHANGE UP (ref 135–145)
SOURCE RESPIRATORY: SIGNIFICANT CHANGE UP
SP GR SPEC: 1.02 — SIGNIFICANT CHANGE UP (ref 1–1.03)
UROBILINOGEN FLD QL: 1 MG/DL — SIGNIFICANT CHANGE UP (ref 0.2–1)
WBC # BLD: 7 K/UL — SIGNIFICANT CHANGE UP (ref 3.8–10.5)
WBC # FLD AUTO: 7 K/UL — SIGNIFICANT CHANGE UP (ref 3.8–10.5)

## 2025-07-30 PROCEDURE — 93010 ELECTROCARDIOGRAM REPORT: CPT

## 2025-07-30 PROCEDURE — 71045 X-RAY EXAM CHEST 1 VIEW: CPT | Mod: 26

## 2025-07-30 PROCEDURE — 99285 EMERGENCY DEPT VISIT HI MDM: CPT

## 2025-07-30 RX ORDER — ACETAMINOPHEN 500 MG/5ML
1000 LIQUID (ML) ORAL ONCE
Refills: 0 | Status: COMPLETED | OUTPATIENT
Start: 2025-07-30 | End: 2025-07-30

## 2025-07-30 RX ORDER — LEVALBUTEROL HYDROCHLORIDE 1.25 MG/3ML
1.25 SOLUTION RESPIRATORY (INHALATION)
Refills: 0 | Status: COMPLETED | OUTPATIENT
Start: 2025-07-30 | End: 2025-07-30

## 2025-07-30 RX ORDER — FLUTICASONE PROPIONATE 50 UG/1
1 SPRAY, METERED NASAL ONCE
Refills: 0 | Status: COMPLETED | OUTPATIENT
Start: 2025-07-30 | End: 2025-07-30

## 2025-07-30 RX ADMIN — LEVALBUTEROL HYDROCHLORIDE 1.25 MILLIGRAM(S): 1.25 SOLUTION RESPIRATORY (INHALATION) at 23:52

## 2025-07-30 RX ADMIN — Medication 400 MILLIGRAM(S): at 23:51

## 2025-07-30 RX ADMIN — FLUTICASONE PROPIONATE 1 SPRAY(S): 50 SPRAY, METERED NASAL at 23:51

## 2025-07-30 NOTE — ED PROVIDER NOTE - PROGRESS NOTE DETAILS
Results reported to patient--grossly benign, labs nad imaging unremarkable   Pt. reports feeling better after meds  pt. agrees to f/u with primary care outpt., pulma referral given for urgent f/u   pt. understands to return to ED if symptoms worsen; will d/c

## 2025-07-30 NOTE — ED PROVIDER NOTE - CLINICAL SUMMARY MEDICAL DECISION MAKING FREE TEXT BOX
54 year old female with h/o HTN, DM (noncompliant, has not taken her metformin and insulin in two weeks, pt currently has no medications), neuropathy, obesity, tachycardia and left breast abscess presents today sent in from the urgent care center for evaluation.  Pt reports one week h/o cold symptoms, she reports having a productive cough, nasal congestion and lower extremity pain.  she did go to an urgent care but states that she was not told what she had and she requested to go to the ER, she denies chest pain, reports having sob today. denies nausea or vomiting, diarrhea, travel or sick contacts. Her exam is as noted above.  DDx includes but not limited to viral vs bacterial infection, dvt/pe, dehydration.  Plan is for labs, ekg, ivf, cxr.  Will reassess and dispo.

## 2025-07-30 NOTE — ED PROVIDER NOTE - MUSCULOSKELETAL, MLM
Spine appears normal, range of motion is not limited, no muscle or joint tenderness +bilateral feet swelling, noted to have black discoloration around toes and plantar feet which she states is due to her  shoes, ?dirt

## 2025-07-30 NOTE — ED ADULT NURSE NOTE - PLAN OF CARE
Position of comfort
PAST SURGICAL HISTORY:  H/O carpal tunnel repair     H/O foot surgery     History of lung surgery Right VATS RLL wedge resection 2019    S/P craniofacial reconstruction     S/p tibial fracture

## 2025-07-30 NOTE — ED PROVIDER NOTE - CONSTITUTIONAL MANNER
RN attempted to call mother to set up a consultation regarding pts upper lip per MD request. Phone rang a few times and was disconnected to a busy tone.     Mayuri Bocanegra RN     appropriate for situation

## 2025-07-30 NOTE — ED ADULT TRIAGE NOTE - CHIEF COMPLAINT QUOTE
Pt states x3 days has b/l lower extremity pain and swelling, also has nasal congestion. Difficulty ambulating due to pain. Also states ran out of her diabetes medication x 2 weeks due to not seeing her PCP takes Metformin and insulin. PMH Diabetes.

## 2025-07-30 NOTE — ED ADULT NURSE NOTE - OBJECTIVE STATEMENT
Pt is a 55yo Female AAOx4 NKDA pmh dm BIBA pw nasal congestion, productive cough thick white sputum for the past two days, slight dizziness, and b/l foot pain and edema +2 for the past four days. Pt placed on monitor, sinus tach observed at 120. Pt RA saturations 98%, no tachypnea at this time. Pt labs sent as ordered. PT updated on plan of care. alexander JHA.

## 2025-07-30 NOTE — ED PROVIDER NOTE - PATIENT PORTAL LINK FT
You can access the FollowMyHealth Patient Portal offered by [facility] by registering at the following website: [email or web address]. By joining [facility]’s FollowMyHealth portal, you will also be able to view your health information using other applications (apps) compatible with our system.

## 2025-07-30 NOTE — ED PROVIDER NOTE - CARE PROVIDER_API CALL
Delio Donnelly ()  Pulmonary Disease  [facility] [place] [address]  Phone: [phone]  Fax: [phone]  Follow Up Time: Urgent

## 2025-07-30 NOTE — ED ADULT TRIAGE NOTE - CCCP TRG CHIEF CMPLNT
Bilateral Lower extremity pain/swelling/abdominal pain Bilateral Lower extremity pain/swelling/pain, lower leg

## 2025-07-31 VITALS
OXYGEN SATURATION: 96 % | DIASTOLIC BLOOD PRESSURE: 79 MMHG | SYSTOLIC BLOOD PRESSURE: 128 MMHG | TEMPERATURE: 98 F | RESPIRATION RATE: 18 BRPM | HEART RATE: 112 BPM

## 2025-07-31 LAB — D DIMER BLD IA.RAPID-MCNC: 187 NG/ML DDU — SIGNIFICANT CHANGE UP

## 2025-07-31 PROCEDURE — 93970 EXTREMITY STUDY: CPT | Mod: 26

## 2025-07-31 PROCEDURE — 71250 CT THORAX DX C-: CPT | Mod: 26

## 2025-07-31 RX ORDER — METFORMIN HYDROCHLORIDE 850 MG/1
1000 TABLET ORAL ONCE
Refills: 0 | Status: COMPLETED | OUTPATIENT
Start: 2025-07-31 | End: 2025-07-31

## 2025-07-31 RX ADMIN — Medication 500 MILLILITER(S): at 02:31

## 2025-07-31 RX ADMIN — METFORMIN HYDROCHLORIDE 1000 MILLIGRAM(S): 850 TABLET ORAL at 05:07

## 2025-07-31 RX ADMIN — LEVALBUTEROL HYDROCHLORIDE 1.25 MILLIGRAM(S): 1.25 SOLUTION RESPIRATORY (INHALATION) at 00:20

## 2025-07-31 RX ADMIN — LEVALBUTEROL HYDROCHLORIDE 1.25 MILLIGRAM(S): 1.25 SOLUTION RESPIRATORY (INHALATION) at 01:03

## 2025-08-01 DIAGNOSIS — Z87.2 PERSONAL HISTORY OF DISEASES OF THE SKIN AND SUBCUTANEOUS TISSUE: ICD-10-CM

## 2025-08-01 DIAGNOSIS — R06.02 SHORTNESS OF BREATH: ICD-10-CM

## 2025-08-01 DIAGNOSIS — E66.9 OBESITY, UNSPECIFIED: ICD-10-CM

## 2025-08-01 DIAGNOSIS — M25.475 EFFUSION, LEFT FOOT: ICD-10-CM

## 2025-08-01 DIAGNOSIS — I10 ESSENTIAL (PRIMARY) HYPERTENSION: ICD-10-CM

## 2025-08-01 DIAGNOSIS — E11.9 TYPE 2 DIABETES MELLITUS WITHOUT COMPLICATIONS: ICD-10-CM

## 2025-08-01 DIAGNOSIS — Z79.84 LONG TERM (CURRENT) USE OF ORAL HYPOGLYCEMIC DRUGS: ICD-10-CM

## 2025-08-01 DIAGNOSIS — E11.40 TYPE 2 DIABETES MELLITUS WITH DIABETIC NEUROPATHY, UNSPECIFIED: ICD-10-CM

## 2025-08-01 DIAGNOSIS — Z79.82 LONG TERM (CURRENT) USE OF ASPIRIN: ICD-10-CM

## 2025-08-01 DIAGNOSIS — F17.200 NICOTINE DEPENDENCE, UNSPECIFIED, UNCOMPLICATED: ICD-10-CM

## 2025-08-01 DIAGNOSIS — Z79.4 LONG TERM (CURRENT) USE OF INSULIN: ICD-10-CM

## 2025-08-01 DIAGNOSIS — T38.3X6A UNDERDOSING OF INSULIN AND ORAL HYPOGLYCEMIC [ANTIDIABETIC] DRUGS, INITIAL ENCOUNTER: ICD-10-CM

## 2025-08-01 DIAGNOSIS — M25.474 EFFUSION, RIGHT FOOT: ICD-10-CM

## 2025-08-01 DIAGNOSIS — M79.661 PAIN IN RIGHT LOWER LEG: ICD-10-CM

## 2025-08-01 DIAGNOSIS — R09.81 NASAL CONGESTION: ICD-10-CM

## 2025-08-01 DIAGNOSIS — R05.9 COUGH, UNSPECIFIED: ICD-10-CM

## 2025-08-01 LAB
METHOD TYPE: SIGNIFICANT CHANGE UP
SALMONELLA DNA BLD POS QL NAA+NON-PROBE: SIGNIFICANT CHANGE UP

## 2025-08-01 NOTE — POST DISCHARGE NOTE - DETAILS:
attempted to call patients cell phone on file ([phone]) and house number on file ([phone]) neither phone number rang, no voice mail set up.  call placed to patient's sister on file Magy ([phone]), sister answered phone, states she will message her sister that the Er is trying to get in contact with her, was provided ER phone number for call back and my name.

## 2025-08-01 NOTE — POST DISCHARGE NOTE - NOTIFICATION:
Call received from [facility] core lab with blood cultures which were drawn on 7/30/2025 with anaerobic bottle with gram-negative beto growth.

## 2025-08-03 LAB
-  AMPICILLIN: SIGNIFICANT CHANGE UP
-  CEFTRIAXONE: SIGNIFICANT CHANGE UP
-  TRIMETHOPRIM/SULFAMETHOXAZOLE: SIGNIFICANT CHANGE UP
METHOD TYPE: SIGNIFICANT CHANGE UP

## 2025-08-03 NOTE — ED POST DISCHARGE NOTE - ADDITIONAL DOCUMENTATION
On 8/4/25 at approx 1215 patient called ED back. JOSE Marquez NP spoke with patient and notified her of her positive blood cultures. Patient reports she is feeling well, denies fever and chills, denies n/v/d. Patient believes that these results are false and believes that her blood work was mixed up with the patient who was next to her during her visit in the ED as she states the person next to her had GI symptoms, nausea, and vomiting. Patient advised that this would be very unlikely to occur as labs are lebeld at [facility] immediately after obtianing them. I advised patient that although this could be a contaminated sample she should come back to the ED to get blood cultures re-sent. 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 When ya'll straighten this out someone can give me a call" and hung up. I escalated to my supervision Mariia BERNAL

## 2025-08-03 NOTE — ED POST DISCHARGE NOTE - DETAILS
Called pt and unreachable , unable to leave voice mail. Called sister (listed as emergency contact) left message to call back the ed. DOLORES Forrest: patient called back and spoke with NP Jennifer, escalated to me as patient is concerned her blood samples were exchanged with someone else, called back patient to discuss concerns and review results however patient hungup as she said she is homeless and is too busy to talk right now as she needs to confirm a place to stay tonight and hung up before I could continue. States she would call back later.

## 2025-08-04 LAB
-  CIPROFLOXACIN: SIGNIFICANT CHANGE UP
CULTURE RESULTS: ABNORMAL
GRAM STN FLD: ABNORMAL
METHOD TYPE: SIGNIFICANT CHANGE UP
ORGANISM # SPEC MICROSCOPIC CNT: ABNORMAL
ORGANISM # SPEC MICROSCOPIC CNT: SIGNIFICANT CHANGE UP
SPECIMEN SOURCE: SIGNIFICANT CHANGE UP

## 2025-08-05 LAB
CULTURE RESULTS: SIGNIFICANT CHANGE UP
SPECIMEN SOURCE: SIGNIFICANT CHANGE UP